# Patient Record
Sex: FEMALE | Race: WHITE | URBAN - METROPOLITAN AREA
[De-identification: names, ages, dates, MRNs, and addresses within clinical notes are randomized per-mention and may not be internally consistent; named-entity substitution may affect disease eponyms.]

---

## 2017-01-31 ENCOUNTER — OFFICE VISIT (OUTPATIENT)
Dept: FAMILY MEDICINE | Facility: CLINIC | Age: 32
End: 2017-01-31
Payer: COMMERCIAL

## 2017-01-31 VITALS
SYSTOLIC BLOOD PRESSURE: 120 MMHG | RESPIRATION RATE: 16 BRPM | WEIGHT: 138 LBS | HEART RATE: 68 BPM | HEIGHT: 66 IN | TEMPERATURE: 98.1 F | BODY MASS INDEX: 22.18 KG/M2 | DIASTOLIC BLOOD PRESSURE: 78 MMHG

## 2017-01-31 DIAGNOSIS — R87.612 PAPANICOLAOU SMEAR OF CERVIX WITH LOW GRADE SQUAMOUS INTRAEPITHELIAL LESION (LGSIL): ICD-10-CM

## 2017-01-31 LAB — BETA HCG QUAL IFA URINE: NEGATIVE

## 2017-01-31 PROCEDURE — 88305 TISSUE EXAM BY PATHOLOGIST: CPT | Mod: 59 | Performed by: FAMILY MEDICINE

## 2017-01-31 PROCEDURE — 57421 EXAM/BIOPSY OF VAG W/SCOPE: CPT | Performed by: FAMILY MEDICINE

## 2017-01-31 PROCEDURE — 99213 OFFICE O/P EST LOW 20 MIN: CPT | Mod: 25 | Performed by: FAMILY MEDICINE

## 2017-01-31 PROCEDURE — 84703 CHORIONIC GONADOTROPIN ASSAY: CPT | Performed by: FAMILY MEDICINE

## 2017-01-31 NOTE — PROGRESS NOTES
Lauren Fallon is a .31 year old female who presents for initial colposcopy, referred by Dr Cuadra. Pap smear 2 months ago showed: LSIL with positive high risk HPV . The prior pap showed LSIL.     Past Medical History   Diagnosis Date     Papanicolaou smear of cervix with low grade squamous intraepithelial lesion (LGSIL) (aka LSIL) 10/2015, 11/09/16     colp - WNL     Hx of colposcopy with cervical biopsy      Cervical high risk HPV (human papillomavirus) test positive 11/09/16     Type 16 and other     Family History   Problem Relation Age of Onset     Unknown/Adopted Mother      Unknown/Adopted Father        Previous history of abnormal paps?: Yes  In 2015 was LSIL   History of cryotherapy (freezing)?: : No  History of veneral diseases: : No  Do you desire testing for any of these diseases? : No  History of genital warts:  No  Visible warts now?:  No  Previously treated? If so, how?:  No     No LMP recorded.    Type of contraception: oral contraceptive  Age at first sexual intercourse: 20  Number of sexual partners (lifetime): 5   History   Smoking status     Never Smoker    Smokeless tobacco     Never Used       History of sexual abuse:  No    No Known Allergies    PROCEDURE:  Before the procedure, it was ensured that the patient was educated regarding the nature of her findings to date, the implications of them, and what was to be done. She has been made aware of the role of HPV, the natural history of infection, ways to minimize her future risk, the effect of HPV on the cervix, and treatment options available should they be indicated. The   pathophysiology of the cervix, including a discussion of squamous vs. endometrial cells, and squamous metaplasia have all been reviewed, using illustrations and sketches. The details of the colposcopic procedure were reviewed, as well as the risks of missed diagnoses, pain, infection and bleeding. All questions were answered before proceeding, and informed consent was  therefore obtained.    Bimanual examination: was performed and was unremarkable.    The following examination was done with the colposcope:    Unenhanced examination of the cervix was normal without lesions.  Pap smear and endocervical sampling not obtained due to:    not due  Please refer to images section for details!  Pap repeated?:  No  SCJ seen?:  no  Endocervical speculum needed?:  Yes   ECC done?:  Yes   Lugol's solution used?:  Yes   Satisfactory examination?:  no    Vaginal vault: normal to cursory inspection   Urethra normal?:  yes  Labia normal?:  yes  Perineum normal?:  yes  Rectum normal?:  yes    FINDINGS:  Please see image   Cervix: acetowhite area(s) at 6:00 and 9 o'clock   Procedure: biopsies taken (not including ECC): 2.    Procedure summary: Patient tolerated procedure well     Assessment: HPV related changes      Plan: Specimens labelled and sent to pathology., Will base further treatment on pathology findings. and post biopsy instructions given to patient    15 min spent in counseling on the abnormal PAP, colposcopy and the HPV .

## 2017-01-31 NOTE — NURSING NOTE
"Chief Complaint   Patient presents with     Colposcopy     /78 mmHg  Pulse 68  Temp(Src) 98.1  F (36.7  C) (Oral)  Resp 16  Ht 5' 5.5\" (1.664 m)  Wt 138 lb (62.596 kg)  BMI 22.61 kg/m2 Estimated body mass index is 22.61 kg/(m^2) as calculated from the following:    Height as of this encounter: 5' 5.5\" (1.664 m).    Weight as of this encounter: 138 lb (62.596 kg).  bp completed using cuff size: regular       Health Maintenance addressed:  NONE    n/a    Shantell Cerrato MA       "

## 2017-01-31 NOTE — PROGRESS NOTES
SUBJECTIVE:                                                    Lauren Fallon is a 31 year old female who presents to clinic today for the following health issues:        HPI      ROS      Physical Exam

## 2017-02-03 LAB — COPATH REPORT: NORMAL

## 2017-02-15 ENCOUNTER — OFFICE VISIT (OUTPATIENT)
Dept: FAMILY MEDICINE | Facility: CLINIC | Age: 32
End: 2017-02-15
Payer: COMMERCIAL

## 2017-02-15 VITALS
OXYGEN SATURATION: 100 % | BODY MASS INDEX: 23.6 KG/M2 | SYSTOLIC BLOOD PRESSURE: 110 MMHG | RESPIRATION RATE: 14 BRPM | HEART RATE: 64 BPM | TEMPERATURE: 98.1 F | DIASTOLIC BLOOD PRESSURE: 60 MMHG | WEIGHT: 144 LBS

## 2017-02-15 DIAGNOSIS — N87.0 CIN I (CERVICAL INTRAEPITHELIAL NEOPLASIA I): Primary | ICD-10-CM

## 2017-02-15 PROCEDURE — 99213 OFFICE O/P EST LOW 20 MIN: CPT | Performed by: FAMILY MEDICINE

## 2017-02-15 NOTE — PROGRESS NOTES
SUBJECTIVE:                                                    Lauren Fallon is a 31 year old female who presents to clinic today for the following health issues:      Chief Complaint   Patient presents with     Results     colp results-done 1-31-17             Problem list and histories reviewed & adjusted, as indicated.  Additional history: as documented    Patient Active Problem List   Diagnosis     CARDIOVASCULAR SCREENING; LDL GOAL LESS THAN 160     Cystic acne     Cold sore     Family history of breast cancer in mother     Family history of melanoma     Papanicolaou smear of cervix with low grade squamous intraepithelial lesion (LGSIL) (aka LSIL)     Encounter for surveillance of contraceptive pills     Contraception     History reviewed. No pertinent past surgical history.    Social History   Substance Use Topics     Smoking status: Never Smoker     Smokeless tobacco: Never Used     Alcohol use 0.0 oz/week     0 Standard drinks or equivalent per week      Comment: socially     Family History   Problem Relation Age of Onset     Unknown/Adopted Mother      Unknown/Adopted Father          Current Outpatient Prescriptions   Medication Sig Dispense Refill     levonorgestrel-ethinyl estradiol (AVIANE,ALESSE,LESSINA) 0.1-20 MG-MCG per tablet Take 1 tablet by mouth daily 84 tablet 3     spironolactone (ALDACTONE) 50 MG tablet Take 1 tablet (50 mg) by mouth daily 90 tablet 3     valACYclovir (VALTREX) 1000 mg tablet Take 2 tablets (2,000 mg) by mouth 2 times daily At first onset of cold sore for 1 day. 12 tablet 1     fluocinonide (LIDEX) 0.05 % cream Apply sparingly to affected area twice daily for 14 days.  Do not apply to face. 30 g 0     [DISCONTINUED] levonorgestrel-ethinyl estradiol (AVIANE,ALESSE,LESSINA) 0.1-20 MG-MCG per tablet Take 1 tablet by mouth daily 90 tablet 3     No Known Allergies  Recent Labs   Lab Test  11/09/16   1658  10/08/15   0940  09/15/14   0811   09/13/12   1837   LDL   --    --    85   --    --    HDL   --    --   105   --    --    TRIG   --    --   159*   --    --    ALT   --    --    --    --   12   CR  0.71  0.83  0.86   < >  0.88   GFRESTIMATED  >90  Non  GFR Calc    82  78   < >  78   GFRESTBLACK  >90   GFR Calc    >90   GFR Calc    >90   GFR Calc     < >  >90   POTASSIUM  3.6  4.0  3.4   < >  3.8   TSH  1.29   --    --    --   0.90    < > = values in this interval not displayed.      BP Readings from Last 3 Encounters:   02/15/17 110/60   01/31/17 120/78   11/09/16 128/82    Wt Readings from Last 3 Encounters:   02/15/17 144 lb (65.3 kg)   01/31/17 138 lb (62.6 kg)   11/09/16 138 lb (62.6 kg)                  Labs reviewed in EPIC  Problem list, Medication list, Allergies, and Medical/Social/Surgical histories reviewed in [facility] and updated as appropriate.    ROS:  Constitutional, HEENT, cardiovascular, pulmonary, GI, , musculoskeletal, neuro, skin, endocrine and psych systems are negative, except as otherwise noted.    OBJECTIVE:                                                    /60 (BP Location: Right arm, Patient Position: Chair, Cuff Size: Adult Regular)  Pulse 64  Temp 98.1  F (36.7  C) (Oral)  Resp 14  Wt 144 lb (65.3 kg)  SpO2 100%  BMI 23.6 kg/m2  Body mass index is 23.6 kg/(m^2).  GENERAL: healthy, alert and no distress  MS: no gross musculoskeletal defects noted, no edema    Diagnostic Test Results:  none      ASSESSMENT/PLAN:                                                        1. AVIVA I (cervical intraepithelial neoplasia I)  We discussed the colposcopy/biopsy results with the pt and she has AVIVA 1 or mild dysplasia on a couple of the samples , so recommended that she does her PAP in one year,       RTC if no improving or worsening.  Pt is aware  and comfortable with the current plan.      Tiffanie Huang MD  [facility]

## 2017-02-15 NOTE — MR AVS SNAPSHOT
After Visit Summary   2/15/2017    Lauren Fallon    MRN: [id number]           Patient Information     Date Of Birth          1985        Visit Information        Provider Department      2/15/2017 4:00 PM Tiffanie Huang MD [facility]        Today's Diagnoses     AVIVA I (cervical intraepithelial neoplasia I)    -  1       Follow-ups after your visit        Who to contact     If you have questions or need follow up information about today's clinic visit or your schedule please contact [facility] directly at [phone].  Normal or non-critical lab and imaging results will be communicated to you by Mobile Completehart, letter or phone within 4 business days after the clinic has received the results. If you do not hear from us within 7 days, please contact the clinic through [employer] or phone. If you have a critical or abnormal lab result, we will notify you by phone as soon as possible.  Submit refill requests through [email or web address] or call your pharmacy and they will forward the refill request to us. Please allow 3 business days for your refill to be completed.          Additional Information About Your Visit        MyChart Information     [email or web address] gives you secure access to your electronic health record. If you see a primary care provider, you can also send messages to your care team and make appointments. If you have questions, please call your primary care clinic.  If you do not have a primary care provider, please call [phone] and they will assist you.        Care EveryWhere ID     This is your Care EveryWhere ID. This could be used by other organizations to access your [place] medical records  [id number]        Your Vitals Were     Pulse Temperature Respirations Pulse Oximetry BMI (Body Mass Index)       64 98.1  F (36.7  C) (Oral) 14 100% 23.6 kg/m2        Blood Pressure from Last 3 Encounters:   02/15/17 110/60   01/31/17 120/78   11/09/16 128/82    Weight from Last 3  Encounters:   02/15/17 144 lb (65.3 kg)   01/31/17 138 lb (62.6 kg)   11/09/16 138 lb (62.6 kg)              Today, you had the following     No orders found for display       Primary Care Provider Office Phone # Fax #    Shantell Fall [phone]       [address] [address]        Thank you!     Thank you for choosing [facility]  for your care. Our goal is always to provide you with excellent care. Hearing back from our patients is one way we can continue to improve our services. Please take a few minutes to complete the written survey that you may receive in the mail after your visit with us. Thank you!             Your Updated Medication List - Protect others around you: Learn how to safely use, store and throw away your medicines at www.disposemymeds.org.          This list is accurate as of: 2/15/17  9:11 PM.  Always use your most recent med list.                   Brand Name Dispense Instructions for use    fluocinonide 0.05 % cream    LIDEX    30 g    Apply sparingly to affected area twice daily for 14 days.  Do not apply to face.       levonorgestrel-ethinyl estradiol 0.1-20 MG-MCG per tablet    AVIANE,ALESSE,LESSINA    84 tablet    Take 1 tablet by mouth daily       spironolactone 50 MG tablet    ALDACTONE    90 tablet    Take 1 tablet (50 mg) by mouth daily       valACYclovir 1000 mg tablet    VALTREX    12 tablet    Take 2 tablets (2,000 mg) by mouth 2 times daily At first onset of cold sore for 1 day.

## 2017-02-15 NOTE — NURSING NOTE
"Chief Complaint   Patient presents with     Results     colp results-done 1-31-17       Initial /60 (BP Location: Right arm, Patient Position: Chair, Cuff Size: Adult Regular)  Pulse 64  Temp 98.1  F (36.7  C) (Oral)  Resp 14  Wt 144 lb (65.3 kg)  SpO2 100%  BMI 23.6 kg/m2 Estimated body mass index is 23.6 kg/(m^2) as calculated from the following:    Height as of 1/31/17: 5' 5.5\" (1.664 m).    Weight as of this encounter: 144 lb (65.3 kg).  BP completed using cuff size: regular    Health Maintenance that is potentially due pending provider review:  There are no preventive care reminders to display for this patient.      n/a  "

## 2017-04-11 ENCOUNTER — TELEPHONE (OUTPATIENT)
Dept: FAMILY MEDICINE | Facility: CLINIC | Age: 32
End: 2017-04-11

## 2017-04-11 ENCOUNTER — MYC REFILL (OUTPATIENT)
Dept: FAMILY MEDICINE | Facility: CLINIC | Age: 32
End: 2017-04-11

## 2017-04-11 DIAGNOSIS — B00.1 COLD SORE: ICD-10-CM

## 2017-04-11 NOTE — TELEPHONE ENCOUNTER
Message from MyChart:  Original authorizing provider: MD Lauren Abbasi would like a refill of the following medications:  valACYclovir (VALTREX) 1000 mg tablet [Nona Cuadra MD]    Preferred pharmacy: [address] [address]    Comment:

## 2017-04-11 NOTE — TELEPHONE ENCOUNTER
Reason for Call:  Medication or medication refill:    Do you use a [facility]?  Name of the pharmacy and phone number for the current request:            [facility] [id number] [place], [place] - [address] [place] [address] [address]        Name of the medication requested: valACYclovir (VALTREX) 1000 mg tablet  Other request: one month supply    Can we leave a detailed message on this number? YES    Phone number patient can be reached at: Home number on file [phone] (home)    Best Time: anytime    Call taken on 4/11/2017 at 12:15 PM by Karen Jaems

## 2017-04-12 RX ORDER — VALACYCLOVIR HYDROCHLORIDE 1 G/1
2000 TABLET, FILM COATED ORAL 2 TIMES DAILY
Qty: 12 TABLET | Refills: 3 | Status: SHIPPED | OUTPATIENT
Start: 2017-04-12 | End: 2017-10-13

## 2017-04-12 NOTE — TELEPHONE ENCOUNTER
Valtrex         Last Written Prescription Date: 11/9/16  Last Fill Quantity: 12, # refills: 1    [facility] with [facility] or [facility] prescribing provider:  2/15/17   Future Office Visit:       Lab Results   Component Value Date    WBC 7.7 11/09/2016     Lab Results   Component Value Date    RBC 3.58 11/09/2016     Lab Results   Component Value Date    HGB 11.5 11/09/2016     Lab Results   Component Value Date    HCT 34.9 11/09/2016     No components found for: MCT  Lab Results   Component Value Date    MCV 98 11/09/2016     Lab Results   Component Value Date    MCH 32.1 11/09/2016     Lab Results   Component Value Date    MCHC 33.0 11/09/2016     Lab Results   Component Value Date    RDW 11.7 11/09/2016     Lab Results   Component Value Date     11/09/2016     Lab Results   Component Value Date    AST 29 09/13/2012     Lab Results   Component Value Date    ALT 12 09/13/2012     Creatinine   Date Value Ref Range Status   11/09/2016 0.71 0.52 - 1.04 mg/dL Final   ]  Prescription approved per [facility] Refill Protocol.

## 2017-06-14 ENCOUNTER — OFFICE VISIT (OUTPATIENT)
Dept: FAMILY MEDICINE | Facility: CLINIC | Age: 32
End: 2017-06-14
Payer: COMMERCIAL

## 2017-06-14 VITALS
SYSTOLIC BLOOD PRESSURE: 115 MMHG | WEIGHT: 138.6 LBS | DIASTOLIC BLOOD PRESSURE: 78 MMHG | OXYGEN SATURATION: 99 % | HEART RATE: 88 BPM | HEIGHT: 66 IN | BODY MASS INDEX: 22.28 KG/M2 | TEMPERATURE: 97.4 F

## 2017-06-14 DIAGNOSIS — Z11.3 SCREEN FOR STD (SEXUALLY TRANSMITTED DISEASE): ICD-10-CM

## 2017-06-14 DIAGNOSIS — R10.84 ABDOMINAL PAIN, GENERALIZED: Primary | ICD-10-CM

## 2017-06-14 DIAGNOSIS — D64.9 MILD ANEMIA: ICD-10-CM

## 2017-06-14 LAB
BASOPHILS # BLD AUTO: 0 10E9/L (ref 0–0.2)
BASOPHILS NFR BLD AUTO: 0.4 %
DIFFERENTIAL METHOD BLD: ABNORMAL
EOSINOPHIL # BLD AUTO: 0.1 10E9/L (ref 0–0.7)
EOSINOPHIL NFR BLD AUTO: 1.6 %
ERYTHROCYTE [DISTWIDTH] IN BLOOD BY AUTOMATED COUNT: 12.8 % (ref 10–15)
HCT VFR BLD AUTO: 35.4 % (ref 35–47)
HGB BLD-MCNC: 12 G/DL (ref 11.7–15.7)
LYMPHOCYTES # BLD AUTO: 1.7 10E9/L (ref 0.8–5.3)
LYMPHOCYTES NFR BLD AUTO: 20.6 %
MCH RBC QN AUTO: 34.3 PG (ref 26.5–33)
MCHC RBC AUTO-ENTMCNC: 33.9 G/DL (ref 31.5–36.5)
MCV RBC AUTO: 101 FL (ref 78–100)
MONOCYTES # BLD AUTO: 0.7 10E9/L (ref 0–1.3)
MONOCYTES NFR BLD AUTO: 8.9 %
NEUTROPHILS # BLD AUTO: 5.5 10E9/L (ref 1.6–8.3)
NEUTROPHILS NFR BLD AUTO: 68.5 %
PLATELET # BLD AUTO: 306 10E9/L (ref 150–450)
RBC # BLD AUTO: 3.5 10E12/L (ref 3.8–5.2)
WBC # BLD AUTO: 8.1 10E9/L (ref 4–11)

## 2017-06-14 PROCEDURE — 87491 CHLMYD TRACH DNA AMP PROBE: CPT | Performed by: FAMILY MEDICINE

## 2017-06-14 PROCEDURE — 85025 COMPLETE CBC W/AUTO DIFF WBC: CPT | Performed by: FAMILY MEDICINE

## 2017-06-14 PROCEDURE — 36415 COLL VENOUS BLD VENIPUNCTURE: CPT | Performed by: FAMILY MEDICINE

## 2017-06-14 PROCEDURE — 82607 VITAMIN B-12: CPT | Performed by: FAMILY MEDICINE

## 2017-06-14 PROCEDURE — 99214 OFFICE O/P EST MOD 30 MIN: CPT | Performed by: FAMILY MEDICINE

## 2017-06-14 PROCEDURE — 87591 N.GONORRHOEAE DNA AMP PROB: CPT | Performed by: FAMILY MEDICINE

## 2017-06-14 NOTE — MR AVS SNAPSHOT
"              After Visit Summary   6/14/2017    Lauren Fallon    MRN: [id number]           Patient Information     Date Of Birth          1985        Visit Information        Provider Department      6/14/2017 8:15 AM Lori Meadows MD [facility]        Today's Diagnoses     Abdominal pain, generalized    -  1    Mild anemia - high MCV        Screen for STD (sexually transmitted disease)           Follow-ups after your visit        Who to contact     If you have questions or need follow up information about today's clinic visit or your schedule please contact [facility] directly at [phone].  Normal or non-critical lab and imaging results will be communicated to you by MyChart, letter or phone within 4 business days after the clinic has received the results. If you do not hear from us within 7 days, please contact the clinic through [employer]hart or phone. If you have a critical or abnormal lab result, we will notify you by phone as soon as possible.  Submit refill requests through [employer] or call your pharmacy and they will forward the refill request to us. Please allow 3 business days for your refill to be completed.          Additional Information About Your Visit        MyChart Information     [employer] gives you secure access to your electronic health record. If you see a primary care provider, you can also send messages to your care team and make appointments. If you have questions, please call your primary care clinic.  If you do not have a primary care provider, please call [phone] and they will assist you.        Care EveryWhere ID     This is your Care EveryWhere ID. This could be used by other organizations to access your [place] medical records  [id number]        Your Vitals Were     Pulse Temperature Height Last Period Pulse Oximetry Breastfeeding?    88 97.4  F (36.3  C) (Oral) 5' 5.5\" (1.664 m) 06/12/2017 (Exact Date) 99% No    BMI (Body Mass Index) "                   22.71 kg/m2            Blood Pressure from Last 3 Encounters:   06/14/17 115/78   02/15/17 110/60   01/31/17 120/78    Weight from Last 3 Encounters:   06/14/17 138 lb 9.6 oz (62.9 kg)   02/15/17 144 lb (65.3 kg)   01/31/17 138 lb (62.6 kg)              We Performed the Following     CBC with platelets and differential     CHLAMYDIA TRACHOMATIS PCR     NEISSERIA GONORRHOEA PCR     Vitamin B12        Primary Care Provider Office Phone # Fax #    Nona Eduardo Cuadra [phone]       [facility] [address] [facility] [address]        Equal Access to Services     HANNA AYALA : Adal Mijares, wagerman bender, qaybta kaalmada halina, jesus noriega . So [facility] [phone].    ATENCIÓN: Si habla español, tiene a baltazar disposición servicios gratuitos de asistencia lingüística. Llame al [phone].    We comply with applicable federal civil rights laws and [place] laws. We do not discriminate on the basis of race, color, national origin, age, disability sex, sexual orientation or gender identity.            Thank you!     Thank you for choosing [facility]  for your care. Our goal is always to provide you with excellent care. Hearing back from our patients is one way we can continue to improve our services. Please take a few minutes to complete the written survey that you may receive in the mail after your visit with us. Thank you!             Your Updated Medication List - Protect others around you: Learn how to safely use, store and throw away your medicines at www.disposemymeds.org.          This list is accurate as of: 6/14/17 11:59 PM.  Always use your most recent med list.                   Brand Name Dispense Instructions for use Diagnosis    fluocinonide 0.05 % cream    LIDEX    30 g    Apply sparingly to affected area twice daily for 14 days.  Do not apply to face.    Phytophotodermatitis        levonorgestrel-ethinyl estradiol 0.1-20 MG-MCG per tablet    AVIANE,ALESSE,LESSINA    84 tablet    Take 1 tablet by mouth daily    Family planning       spironolactone 50 MG tablet    ALDACTONE    90 tablet    Take 1 tablet (50 mg) by mouth daily    Cystic acne       valACYclovir 1000 mg tablet    VALTREX    12 tablet    Take 2 tablets (2,000 mg) by mouth 2 times daily At first onset of cold sore for 1 day.    Cold sore

## 2017-06-14 NOTE — PROGRESS NOTES
SUBJECTIVE:                                                    Lauren Fallon is a 31 year old female who presents to clinic today for the following health issues:    Abdominal Pain      Duration: yesterday     Description (location/character/radiation): near belly button       Associated flank pain: None    Intensity:  moderate    Accompanying signs and symptoms:        Fever/Chills: no        Gas/Bloating: YES- bloated        Nausea/vomitting: no        Diarrhea: no        Dysuria or Hematuria: no     History (previous similar pain/trauma/previous testing): none    Precipitating or alleviating factors:       Pain worse with eating/BM/urination: no       Pain relieved by BM: YES- somewhat     Therapies tried and outcome: None    LMP:  6/12/2017     Stomach pain-   Yesterday morning, felt crampy, near umbilicus.  Got period Mon night, so didn't think much.  Stomach tenderness to touch by noon yesteday.  Last night, could sleep, but hurts when walking, touching.  Feels really bloated in lower abd and around belly button.  Pain rated 5/10.  Just slightly worse than yesterday morning.    Loss of appetite, feels full.  Still eating, but not as much, drinking.  Has had BMs, normal.  No change.  No fevers.  No urinary sx's.  New partner since gc/chlam ni 11/16, no condom use.    Period now- usually has back cramps and soreness.  This is painful, gassy (but no gas), bloated.      Problem list and histories reviewed & adjusted, as indicated.  Additional history: as documented    Patient Active Problem List   Diagnosis     CARDIOVASCULAR SCREENING; LDL GOAL LESS THAN 160     Cystic acne     Cold sore     Family history of breast cancer in mother     Family history of melanoma     Papanicolaou smear of cervix with low grade squamous intraepithelial lesion (LGSIL) (aka LSIL)     Encounter for surveillance of contraceptive pills     Contraception      Current Outpatient Prescriptions   Medication Sig Dispense Refill  "    levonorgestrel-ethinyl estradiol (AVIANE,ALESSE,LESSINA) 0.1-20 MG-MCG per tablet Take 1 tablet by mouth daily 84 tablet 3     spironolactone (ALDACTONE) 50 MG tablet Take 1 tablet (50 mg) by mouth daily 90 tablet 3     valACYclovir (VALTREX) 1000 mg tablet Take 2 tablets (2,000 mg) by mouth 2 times daily At first onset of cold sore for 1 day. 12 tablet 3     fluocinonide (LIDEX) 0.05 % cream Apply sparingly to affected area twice daily for 14 days.  Do not apply to face. 30 g 0     No Known Allergies  BP Readings from Last 3 Encounters:   06/14/17 115/78   02/15/17 110/60   01/31/17 120/78    Wt Readings from Last 3 Encounters:   06/14/17 138 lb 9.6 oz (62.9 kg)   02/15/17 144 lb (65.3 kg)   01/31/17 138 lb (62.6 kg)           Labs reviewed in EPIC    Reviewed and updated as needed this visit by clinical staff  Tobacco  Allergies  Meds  Problems       Reviewed and updated as needed this visit by Provider  Allergies  Meds  Problems         ROS:  Constitutional, HEENT, cardiovascular, pulmonary, gi and gu systems are negative, except as otherwise noted.    OBJECTIVE:     /78  Pulse 88  Temp 97.4  F (36.3  C) (Oral)  Ht 5' 5.5\" (1.664 m)  Wt 138 lb 9.6 oz (62.9 kg)  LMP 06/12/2017 (Exact Date)  SpO2 99%  Breastfeeding? No  BMI 22.71 kg/m2  Body mass index is 22.71 kg/(m^2).  GENERAL APPEARANCE: healthy, alert and no distress     EYES: PERRL, sclera clear     HENT: nose and mouth without ulcers or lesions     NECK: no adenopathy, no asymmetry, masses, or scars and thyroid normal to palpation     RESP: lungs clear to auscultation - no rales, rhonchi or wheezes     CV: regular rates and rhythm, normal S1 S2, no S3 or S4 and no murmur, click or rub      Abdomen: soft, nontender, no HSM or masses and bowel sounds normal     Ext: warm, dry, no edema     Diagnostic Test Results:  Results for orders placed or performed in visit on 06/14/17   CBC with platelets and differential   Result Value Ref Range "    WBC 8.1 4.0 - 11.0 10e9/L    RBC Count 3.50 (L) 3.8 - 5.2 10e12/L    Hemoglobin 12.0 11.7 - 15.7 g/dL    Hematocrit 35.4 35.0 - 47.0 %     (H) 78 - 100 fl    MCH 34.3 (H) 26.5 - 33.0 pg    MCHC 33.9 31.5 - 36.5 g/dL    RDW 12.8 10.0 - 15.0 %    Platelet Count 306 150 - 450 10e9/L    Diff Method Automated Method     % Neutrophils 68.5 %    % Lymphocytes 20.6 %    % Monocytes 8.9 %    % Eosinophils 1.6 %    % Basophils 0.4 %    Absolute Neutrophil 5.5 1.6 - 8.3 10e9/L    Absolute Lymphocytes 1.7 0.8 - 5.3 10e9/L    Absolute Monocytes 0.7 0.0 - 1.3 10e9/L    Absolute Eosinophils 0.1 0.0 - 0.7 10e9/L    Absolute Basophils 0.0 0.0 - 0.2 10e9/L   Vitamin B12   Result Value Ref Range    Vitamin B12 398 193 - 986 pg/mL   NEISSERIA GONORRHOEA PCR   Result Value Ref Range    Specimen Descrip Cervix     N Gonorrhea PCR  NEG     Negative   Negative for N. gonorrhoeae rRNA by transcription mediated amplification.   A negative result by transcription mediated amplification does not preclude the   presence of N. gonorrhoeae infection because results are dependent on proper   and adequate collection, absence of inhibitors, and sufficient rRNA to be   detected.     CHLAMYDIA TRACHOMATIS PCR   Result Value Ref Range    Specimen Description Cervix     Chlamydia Trachomatis PCR  NEG     Negative   Negative for C. trachomatis rRNA by transcription mediated amplification.   A negative result by transcription mediated amplification does not preclude the   presence of C. trachomatis infection because results are dependent on proper   and adequate collection, absence of inhibitors, and sufficient rRNA to be   detected.         ASSESSMENT/PLAN:       ICD-10-CM    1. Abdominal pain, generalized R10.84 CBC with platelets and differential     NEISSERIA GONORRHOEA PCR     CHLAMYDIA TRACHOMATIS PCR   2. Mild anemia - high MCV D64.9 Vitamin B12     CANCELED: Folate   3. Screen for STD (sexually transmitted disease) Z11.3 NEISSERIA  GONORRHOEA PCR     CHLAMYDIA TRACHOMATIS PCR     Generalized abd pain since yesterday, 5/10.  First exam, pain most around umbilicus, but painful to mild/mod palpation all over.  WBC normal.  Re-examined with pelvic- no pain to CMT, no pain to palpation of uterus or ovaries.  Pain also more localized to LLQ, minimal pain on right side or umbilicus, no left pelvic area pain.    Discussed reassurance with nl WBC, and no pain to deep palpation in RLQ at end of exam.   Pt states no chance she could be pregnant with completely normal period and on consistent OCP.  Will try miralax to see if sx's improve/resolve.  Will check gc/chlam with new partner, but pelvic area pain or CMT.     Stressed importance of returning to clinic if persisting pain, to ER if worsening pain.      Hgb low end of normal, with MCV of 102.  Will see if able to add B12 and folate to labs (looks like unable to add folate).      Lori Meadows MD  [facility]

## 2017-06-14 NOTE — NURSING NOTE
"Chief Complaint   Patient presents with     Abdominal Pain     /78  Pulse 88  Temp 97.4  F (36.3  C) (Oral)  Ht 5' 5.5\" (1.664 m)  Wt 138 lb 9.6 oz (62.9 kg)  LMP 06/12/2017 (Exact Date)  SpO2 99%  Breastfeeding? No  BMI 22.71 kg/m2 Estimated body mass index is 22.71 kg/(m^2) as calculated from the following:    Height as of this encounter: 5' 5.5\" (1.664 m).    Weight as of this encounter: 138 lb 9.6 oz (62.9 kg).  BP completed using cuff size: regular       Health Maintenance due pending provider review:  NONE    n/a    Sarah Luna MA  "

## 2017-06-15 LAB
C TRACH DNA SPEC QL NAA+PROBE: NORMAL
N GONORRHOEA DNA SPEC QL NAA+PROBE: NORMAL
SPECIMEN SOURCE: NORMAL
SPECIMEN SOURCE: NORMAL

## 2017-06-16 LAB — VIT B12 SERPL-MCNC: 398 PG/ML (ref 193–986)

## 2017-06-29 PROBLEM — R71.8 ELEVATED MCV: Status: ACTIVE | Noted: 2017-06-29

## 2017-06-29 NOTE — PROGRESS NOTES
Here are your lab results from your recent visit...  -Your STD labs (gonorrhea, chlamydia) were both negative.  -Your CBC labs (which includes blood labs looking for signs of infection or anemia) showed a slightly improved hemoglobin which is good, though the size of your red blood cells are still high (MCV now at 101).  I was able to add on a Vitamin B12 lab to your blood draw, and this was normal.  I wasn't able to add on a folate level.  Deficiencies in either B12 or folate can cause an elevation in the MCV and anemia.  I don't think you need to come back in to have this checked, but I will put a note in your chart to continue to monitor this for you to make sure it's not getting worse, and to check a folate level the next time you get labs for this.    Please let me know if you have any questions, or if your stomach pain is not resolving.  Best,   Freddy Meadows MD

## 2017-09-30 DIAGNOSIS — L70.0 CYSTIC ACNE: ICD-10-CM

## 2017-10-01 NOTE — TELEPHONE ENCOUNTER
Vienva       Last Written Prescription Date: 11/15/2016  Last Fill Quantity: 84,  # refills: 3   Last Office Visit with G, [facility] or [facility] prescribing provider: 06/14/2017

## 2017-10-02 RX ORDER — TIMOLOL MALEATE 5 MG/ML
SOLUTION/ DROPS OPHTHALMIC
Qty: 28 TABLET | Refills: 0 | Status: SHIPPED | OUTPATIENT
Start: 2017-10-02 | End: 2017-10-13

## 2017-10-02 NOTE — TELEPHONE ENCOUNTER
Medication is being filled for 1 time refill only due to:  due for physical November 2017   Ni OCAMPO RN

## 2017-10-09 RX ORDER — TIMOLOL MALEATE 5 MG/ML
SOLUTION/ DROPS OPHTHALMIC
Start: 2017-10-09

## 2017-10-09 NOTE — TELEPHONE ENCOUNTER
Denied  Refill request too early; Rx sent 10/2/2017 for 1 month, pt needs appt  Ni OCAMPO, RN    Pending Prescriptions:                       Disp   Refills    VIENVA 0.1-20 MG-MCG per tablet [Pharmacy *28 tab*         Sig: TAKE 1 TABLET BY MOUTH  DAILY

## 2017-10-13 ENCOUNTER — OFFICE VISIT (OUTPATIENT)
Dept: FAMILY MEDICINE | Facility: CLINIC | Age: 32
End: 2017-10-13
Payer: COMMERCIAL

## 2017-10-13 VITALS
BODY MASS INDEX: 24.12 KG/M2 | OXYGEN SATURATION: 99 % | WEIGHT: 150.1 LBS | HEIGHT: 66 IN | SYSTOLIC BLOOD PRESSURE: 120 MMHG | DIASTOLIC BLOOD PRESSURE: 78 MMHG | TEMPERATURE: 97.9 F | HEART RATE: 71 BPM

## 2017-10-13 DIAGNOSIS — Z11.3 SCREEN FOR STD (SEXUALLY TRANSMITTED DISEASE): ICD-10-CM

## 2017-10-13 DIAGNOSIS — R87.612 PAPANICOLAOU SMEAR OF CERVIX WITH LOW GRADE SQUAMOUS INTRAEPITHELIAL LESION (LGSIL): ICD-10-CM

## 2017-10-13 DIAGNOSIS — B00.1 COLD SORE: ICD-10-CM

## 2017-10-13 DIAGNOSIS — Z00.00 ROUTINE GENERAL MEDICAL EXAMINATION AT A HEALTH CARE FACILITY: Primary | ICD-10-CM

## 2017-10-13 DIAGNOSIS — L70.0 CYSTIC ACNE: ICD-10-CM

## 2017-10-13 PROCEDURE — 87491 CHLMYD TRACH DNA AMP PROBE: CPT | Performed by: FAMILY MEDICINE

## 2017-10-13 PROCEDURE — 88141 CYTOPATH C/V INTERPRET: CPT | Performed by: INTERNAL MEDICINE

## 2017-10-13 PROCEDURE — 80048 BASIC METABOLIC PNL TOTAL CA: CPT | Performed by: FAMILY MEDICINE

## 2017-10-13 PROCEDURE — 87591 N.GONORRHOEAE DNA AMP PROB: CPT | Performed by: FAMILY MEDICINE

## 2017-10-13 PROCEDURE — 99395 PREV VISIT EST AGE 18-39: CPT | Performed by: FAMILY MEDICINE

## 2017-10-13 PROCEDURE — 87389 HIV-1 AG W/HIV-1&-2 AB AG IA: CPT | Performed by: FAMILY MEDICINE

## 2017-10-13 PROCEDURE — 36415 COLL VENOUS BLD VENIPUNCTURE: CPT | Performed by: FAMILY MEDICINE

## 2017-10-13 PROCEDURE — 88175 CYTOPATH C/V AUTO FLUID REDO: CPT | Performed by: FAMILY MEDICINE

## 2017-10-13 PROCEDURE — 87624 HPV HI-RISK TYP POOLED RSLT: CPT | Performed by: FAMILY MEDICINE

## 2017-10-13 PROCEDURE — 86780 TREPONEMA PALLIDUM: CPT | Performed by: FAMILY MEDICINE

## 2017-10-13 RX ORDER — SPIRONOLACTONE 50 MG/1
50 TABLET, FILM COATED ORAL DAILY
Qty: 90 TABLET | Refills: 3 | Status: SHIPPED | OUTPATIENT
Start: 2017-10-13 | End: 2018-10-19

## 2017-10-13 RX ORDER — VALACYCLOVIR HYDROCHLORIDE 1 G/1
2000 TABLET, FILM COATED ORAL 2 TIMES DAILY
Qty: 12 TABLET | Refills: 3 | Status: SHIPPED | OUTPATIENT
Start: 2017-10-13 | End: 2018-09-05

## 2017-10-13 RX ORDER — LEVONORGESTREL/ETHIN.ESTRADIOL 0.1-0.02MG
1 TABLET ORAL DAILY
Qty: 90 TABLET | Refills: 3 | Status: SHIPPED | OUTPATIENT
Start: 2017-10-13 | End: 2018-10-19

## 2017-10-13 NOTE — PROGRESS NOTES
SUBJECTIVE:   CC: Lauren Fallon is an 31 year old woman who presents for preventive health visit.   Patient reports she Wants refill on oral contraceptive pills 7 sprinolactone- it helps with acne,Also refill on valtrex for cold sore- uses as needed - also wants sexually transmitted infection screening      Physical   Annual:     Getting at least 3 servings of Calcium per day::  Yes    Bi-annual eye exam::  Yes    Dental care twice a year::  Yes    Sleep apnea or symptoms of sleep apnea::  None    Diet::  Regular (no restrictions)    Frequency of exercise::  2-3 days/week    Duration of exercise::  30-45 minutes    Taking medications regularly::  Yes    Medication side effects::  None    Additional concerns today::  No          PROBLEMS TO ADD ON...    Today's PHQ-2 Score:   PHQ-2 ( 1999 [employer]) 10/11/2017   Q1: Little interest or pleasure in doing things 0   Q2: Feeling down, depressed or hopeless 0   PHQ-2 Score 0   Q1: Little interest or pleasure in doing things Not at all   Q2: Feeling down, depressed or hopeless Not at all   PHQ-2 Score 0       Abuse: Current or Past(Physical, Sexual or Emotional)- No  Do you feel safe in your environment - Yes    Social History   Substance Use Topics     Smoking status: Never Smoker     Smokeless tobacco: Never Used     Alcohol use 0.0 oz/week     0 Standard drinks or equivalent per week      Comment: socially     The patient does not drink >3 drinks per day nor >7 drinks per week.    Reviewed orders with patient.  Reviewed health maintenance and updated orders accordingly - Yes  Labs reviewed in EPIC    Patient under age 50, mutual decision reflected in health maintenance.        Pertinent mammograms are reviewed under the imaging tab.  History of abnormal Pap smear: YES - updated in Problem List and Health Maintenance accordingly    Reviewed and updated as needed this visit by clinical staff  Tobacco  Allergies         Reviewed and updated as needed this visit  "by Provider              ROS:  C: NEGATIVE for fever, chills, change in weight  I: NEGATIVE for worrisome rashes, moles or lesions  E: NEGATIVE for vision changes or irritation  ENT: NEGATIVE for ear, mouth and throat problems  R: NEGATIVE for significant cough or SOB  B: NEGATIVE for masses, tenderness or discharge  CV: NEGATIVE for chest pain, palpitations or peripheral edema  GI: NEGATIVE for nausea, abdominal pain, heartburn, or change in bowel habits  : NEGATIVE for unusual urinary or vaginal symptoms. Periods are regular.  M: NEGATIVE for significant arthralgias or myalgia  N: NEGATIVE for weakness, dizziness or paresthesias  P: NEGATIVE for changes in mood or affect     OBJECTIVE:   /78  Pulse 71  Temp 97.9  F (36.6  C) (Oral)  Ht 5' 5.5\" (1.664 m)  Wt 150 lb 1.6 oz (68.1 kg)  LMP 09/25/2017 (Approximate)  SpO2 99%  BMI 24.6 kg/m2  EXAM:  GENERAL: healthy, alert and no distress  EYES: Eyes grossly normal to inspection, PERRL and conjunctivae and sclerae normal  HENT: ear canals and TM's normal, nose and mouth without ulcers or lesions  NECK: no adenopathy, no asymmetry, masses, or scars and thyroid normal to palpation  RESP: lungs clear to auscultation - no rales, rhonchi or wheezes  BREAST: normal without masses, tenderness or nipple discharge and no palpable axillary masses or adenopathy  CV: regular rate and rhythm, normal S1 S2, no S3 or S4, no murmur, click or rub, no peripheral edema and peripheral pulses strong  ABDOMEN: soft, nontender, no hepatosplenomegaly, no masses and bowel sounds normal   (female): normal female external genitalia, normal urethral meatus, vaginal mucosa pink, moist, well rugated, and normal cervix/adnexa/uterus without masses or discharge/ pap and chlamydia & gonorrhea is sent  MS: no gross musculoskeletal defects noted, no edema  SKIN: no suspicious lesions or rashes  NEURO: Normal strength and tone, mentation intact and speech normal  PSYCH: mentation appears " "normal, affect normal/bright    ASSESSMENT/PLAN:   (Z00.00) Routine general medical examination at a health care facility  (primary encounter diagnosis)  Plan: Please see patient instructions - flu vaccine- offered. Patient declined.    Family history of breast cancer- mom diagnosed and  of breast cancer at age 50. Lauren is encouraged to do self breast exam. She has declined genetic studies.      (R82.902) Papanicolaou smear of cervix with low grade squamous intraepithelial lesion (LGSIL) (aka LSIL)  Comment: Plan: HPV High Risk Types DNA Cervical, Pap imaged         thin layer diagnostic with HPV (select HPV         order below)            (B00.1) Cold sore  Comment: Plan: valACYclovir (VALTREX) 1000 mg tablet, 2 tabs twice daily -single day course  Basic       metabolic panel            (L70.0) Cystic acne  Comment: Plan: levonorgestrel-ethinyl estradiol (VIENVA)         0.1-20 MG-MCG per tablet, spironolactone         (ALDACTONE) 50 MG tablet, Basic metabolic panel            (Z11.3) Screen for STD (sexually transmitted disease)  Comment: Plan: Chlamydia trachomatis PCR, Neisseria         gonorrhoeae PCR, HIV Antigen Antibody Combo,         Anti Treponema              COUNSELING:  Reviewed preventive health counseling, as reflected in patient instructions       Regular exercise       Healthy diet/nutrition         reports that she has never smoked. She has never used smokeless tobacco.    Estimated body mass index is 24.6 kg/(m^2) as calculated from the following:    Height as of this encounter: 5' 5.5\" (1.664 m).    Weight as of this encounter: 150 lb 1.6 oz (68.1 kg).         Counseling Resources:  ATP IV Guidelines  Pooled Cohorts Equation Calculator  Breast Cancer Risk Calculator  FRAX Risk Assessment  ICSI Preventive Guidelines  Dietary Guidelines for Americans,   too.me's MyPlate  ASA Prophylaxis  Lung CA Screening    Nona Cuadra MD  [facility] CLINICAnswers for HPI/ROS submitted " by the patient on 10/11/2017   PHQ-2 Score: 0

## 2017-10-13 NOTE — MR AVS SNAPSHOT
After Visit Summary   10/13/2017    Lauren Fallon    MRN: [id number]           Patient Information     Date Of Birth          1985        Visit Information        Provider Department      10/13/2017 4:30 PM Nona Cuadra MD [facility]        Today's Diagnoses     Routine general medical examination at a health care facility    -  1    Papanicolaou smear of cervix with low grade squamous intraepithelial lesion (LGSIL) (aka LSIL)        Cold sore        Cystic acne        Screen for STD (sexually transmitted disease)          Care Instructions      Preventive Health Recommendations  Female Ages 26 - 39  Yearly exam:   See your health care provider every year in order to    Review health changes.     Discuss preventive care.      Review your medicines if you your doctor has prescribed any.    Until age 30: Get a Pap test every three years (more often if you have had an abnormal result).    After age 30: Talk to your doctor about whether you should have a Pap test every 3 years or have a Pap test with HPV screening every 5 years.   You do not need a Pap test if your uterus was removed (hysterectomy) and you have not had cancer.  You should be tested each year for STDs (sexually transmitted diseases), if you're at risk.   Talk to your provider about how often to have your cholesterol checked.  If you are at risk for diabetes, you should have a diabetes test (fasting glucose).  Shots: Get a flu shot each year. Get a tetanus shot every 10 years.   Nutrition:     Eat at least 5 servings of fruits and vegetables each day.    Eat whole-grain bread, whole-wheat pasta and brown rice instead of white grains and rice.    Talk to your provider about Calcium and Vitamin D.     Lifestyle    Exercise at least 150 minutes a week (30 minutes a day, 5 days of the week). This will help you control your weight and prevent disease.    Limit alcohol to one drink per day.    No smoking.     Wear  "sunscreen to prevent skin cancer.    See your dentist every six months for an exam and cleaning.            Follow-ups after your visit        Who to contact     If you have questions or need follow up information about today's clinic visit or your schedule please contact [facility] directly at [phone].  Normal or non-critical lab and imaging results will be communicated to you by MyChart, letter or phone within 4 business days after the clinic has received the results. If you do not hear from us within 7 days, please contact the clinic through [employer] or phone. If you have a critical or abnormal lab result, we will notify you by phone as soon as possible.  Submit refill requests through [employer] or call your pharmacy and they will forward the refill request to us. Please allow 3 business days for your refill to be completed.          Additional Information About Your Visit        MyChart Information     [employer] gives you secure access to your electronic health record. If you see a primary care provider, you can also send messages to your care team and make appointments. If you have questions, please call your primary care clinic.  If you do not have a primary care provider, please call [phone] and they will assist you.        Care EveryWhere ID     This is your Care EveryWhere ID. This could be used by other organizations to access your [place] medical records  [id number]        Your Vitals Were     Pulse Temperature Height Last Period Pulse Oximetry BMI (Body Mass Index)    71 97.9  F (36.6  C) (Oral) 5' 5.5\" (1.664 m) 09/25/2017 (Approximate) 99% 24.6 kg/m2       Blood Pressure from Last 3 Encounters:   10/13/17 120/78   06/14/17 115/78   02/15/17 110/60    Weight from Last 3 Encounters:   10/13/17 150 lb 1.6 oz (68.1 kg)   06/14/17 138 lb 9.6 oz (62.9 kg)   02/15/17 144 lb (65.3 kg)              We Performed the Following     Anti Treponema     Basic metabolic panel     Chlamydia " trachomatis PCR     HIV Antigen Antibody Combo     HPV High Risk Types DNA Cervical     Neisseria gonorrhoeae PCR     Pap imaged thin layer diagnostic with HPV (select HPV order below)          Today's Medication Changes          These changes are accurate as of: 10/13/17  4:57 PM.  If you have any questions, ask your nurse or doctor.               These medicines have changed or have updated prescriptions.        Dose/Directions    levonorgestrel-ethinyl estradiol 0.1-20 MG-MCG per tablet   Commonly known as:  VIENVA   This may have changed:  See the new instructions.   Used for:  Cystic acne   Changed by:  Nona Cuadra MD        Dose:  1 tablet   Take 1 tablet by mouth daily   Quantity:  90 tablet   Refills:  3            Where to get your medicines      These [facility] to [facility] [address] [place] [address] & [address]  [address] [place] [address]     Phone:  [phone]     levonorgestrel-ethinyl estradiol 0.1-20 MG-MCG per tablet    spironolactone 50 MG tablet    valACYclovir 1000 mg tablet                Primary Care Provider Office Phone # Fax #    Nona Cuadra [phone] [address] [facility] [address]        Equal Access to Services     HANNA AYALA AH: Adal elliotto Sobebo, waaxda luqadaha, qaybta kaalmada adeegyada, jesus jenkins. So [facility] [phone].    ATENCIÓN: Si habla español, tiene a baltazar disposición servicios gratuitos de asistencia lingüística. Llame al [phone].    We comply with applicable federal civil rights laws and [place] laws. We do not discriminate on the basis of race, color, national origin, age, disability, sex, sexual orientation, or gender identity.            Thank you!     Thank you for choosing [facility]  for your care. Our goal is always to provide you with excellent care. Hearing back from our patients is one way  we can continue to improve our services. Please take a few minutes to complete the written survey that you may receive in the mail after your visit with us. Thank you!             Your Updated Medication List - Protect others around you: Learn how to safely use, store and throw away your medicines at www.disposemymeds.org.          This list is accurate as of: 10/13/17  4:57 PM.  Always use your most recent med list.                   Brand Name Dispense Instructions for use Diagnosis    levonorgestrel-ethinyl estradiol 0.1-20 MG-MCG per tablet    VIENVA    90 tablet    Take 1 tablet by mouth daily    Cystic acne       spironolactone 50 MG tablet    ALDACTONE    90 tablet    Take 1 tablet (50 mg) by mouth daily    Cystic acne       valACYclovir 1000 mg tablet    VALTREX    12 tablet    Take 2 tablets (2,000 mg) by mouth 2 times daily At first onset of cold sore for 1 day.    Cold sore

## 2017-10-16 LAB
ANION GAP SERPL CALCULATED.3IONS-SCNC: 11 MMOL/L (ref 3–14)
BUN SERPL-MCNC: 11 MG/DL (ref 7–30)
CALCIUM SERPL-MCNC: 9.4 MG/DL (ref 8.5–10.1)
CHLORIDE SERPL-SCNC: 105 MMOL/L (ref 94–109)
CO2 SERPL-SCNC: 21 MMOL/L (ref 20–32)
CREAT SERPL-MCNC: 0.69 MG/DL (ref 0.52–1.04)
GFR SERPL CREATININE-BSD FRML MDRD: >90 ML/MIN/1.7M2
GLUCOSE SERPL-MCNC: 77 MG/DL (ref 70–99)
HIV 1+2 AB+HIV1 P24 AG SERPL QL IA: NONREACTIVE
POTASSIUM SERPL-SCNC: 4 MMOL/L (ref 3.4–5.3)
SODIUM SERPL-SCNC: 137 MMOL/L (ref 133–144)
T PALLIDUM IGG+IGM SER QL: NEGATIVE

## 2017-10-17 LAB
C TRACH DNA SPEC QL NAA+PROBE: NEGATIVE
N GONORRHOEA DNA SPEC QL NAA+PROBE: NEGATIVE
SPECIMEN SOURCE: NORMAL
SPECIMEN SOURCE: NORMAL

## 2017-10-17 NOTE — PROGRESS NOTES
Negative HIV, human immunodeficiency virus  and syphilis  -Kidney function is normal (Cr, GFR), Sodium is normal, Potassium is normal, Calcium is normal, Glucose is normal (diabetes screening test).

## 2017-10-18 DIAGNOSIS — L70.0 CYSTIC ACNE: ICD-10-CM

## 2017-10-19 LAB
COPATH REPORT: ABNORMAL
PAP: ABNORMAL

## 2017-10-19 RX ORDER — SPIRONOLACTONE 50 MG/1
TABLET, FILM COATED ORAL
Start: 2017-10-19

## 2017-10-19 NOTE — TELEPHONE ENCOUNTER
[facility] requesting refill  Denied  Rx sent to local pharmacy 10/13/2017 for 1 year  Ni OCAMPO RN    Pending Prescriptions:                       Disp   Refills    spironolactone (ALDACTONE) 50 MG tablet [*90 tab*             Sig: TAKE 1 TABLET BY MOUTH  DAILY          Potassium   Date Value Ref Range Status   10/13/2017 4.0 3.4 - 5.3 mmol/L Final     Creatinine   Date Value Ref Range Status   10/13/2017 0.69 0.52 - 1.04 mg/dL Final     BP Readings from Last 3 Encounters:   10/13/17 120/78   06/14/17 115/78   02/15/17 110/60

## 2017-10-24 ENCOUNTER — MYC MEDICAL ADVICE (OUTPATIENT)
Dept: FAMILY MEDICINE | Facility: CLINIC | Age: 32
End: 2017-10-24

## 2017-10-24 LAB
FINAL DIAGNOSIS: ABNORMAL
HPV HR 12 DNA CVX QL NAA+PROBE: POSITIVE
HPV16 DNA SPEC QL NAA+PROBE: NEGATIVE
HPV18 DNA SPEC QL NAA+PROBE: NEGATIVE
SPECIMEN DESCRIPTION: ABNORMAL

## 2017-11-13 ENCOUNTER — OFFICE VISIT (OUTPATIENT)
Dept: FAMILY MEDICINE | Facility: CLINIC | Age: 32
End: 2017-11-13
Payer: COMMERCIAL

## 2017-11-13 VITALS
TEMPERATURE: 98.3 F | RESPIRATION RATE: 16 BRPM | WEIGHT: 144.5 LBS | BODY MASS INDEX: 23.22 KG/M2 | DIASTOLIC BLOOD PRESSURE: 60 MMHG | HEART RATE: 76 BPM | SYSTOLIC BLOOD PRESSURE: 110 MMHG | OXYGEN SATURATION: 100 % | HEIGHT: 66 IN

## 2017-11-13 DIAGNOSIS — Z80.8 FAMILY HISTORY OF MELANOMA: ICD-10-CM

## 2017-11-13 DIAGNOSIS — R41.840 POOR CONCENTRATION: Primary | ICD-10-CM

## 2017-11-13 PROCEDURE — 99213 OFFICE O/P EST LOW 20 MIN: CPT | Performed by: PHYSICIAN ASSISTANT

## 2017-11-13 NOTE — MR AVS SNAPSHOT
After Visit Summary   11/13/2017    Lauren Fallon    MRN: [id number]           Patient Information     Date Of Birth          1985        Visit Information        Provider Department      11/13/2017 4:40 PM Lauri Day PA-C [facility]        Today's Diagnoses     Poor concentration    -  1    Family history of melanoma           Follow-ups after your visit        Additional Services     DERMATOLOGY REFERRAL       Your provider has referred you to: N: [facility] [place] [phone]  [email or web address]    Please be aware that coverage of these services is subject to the terms and limitations of your health insurance plan.  Call member services at your health plan with any benefit or coverage questions.      Please bring the following with you to your appointment:    (1) Any X-Rays, CTs or MRIs which have been performed.  Contact the facility where they were done to arrange for  prior to your scheduled appointment.    (2) List of current medications  (3) This referral request   (4) Any documents/labs given to you for this referral            MENTAL HEALTH REFERRAL       Your provider has referred you to: [facility]: [facility] - ADHD & Bariatric Assessments - Adult ADHD Evaluations - [facility] [phone]   [email or web address]   *Please call to schedule an appointment.    All scheduling is subject to the client's specific insurance plan & benefits, provider/location availability, and provider clinical specialities.  Please arrive 15 minutes early for your first appointment and bring your completed paperwork.    Please be aware that coverage of these services is subject to the terms and limitations of your health insurance plan.  Call member services at your health plan with any benefit or coverage questions.                  Who to contact      "If you have questions or need follow up information about today's clinic visit or your schedule please contact [facility] directly at [phone].  Normal or non-critical lab and imaging results will be communicated to you by MyChart, letter or phone within 4 business days after the clinic has received the results. If you do not hear from us within 7 days, please contact the clinic through GetPricehart or phone. If you have a critical or abnormal lab result, we will notify you by phone as soon as possible.  Submit refill requests through [employer] or call your pharmacy and they will forward the refill request to us. Please allow 3 business days for your refill to be completed.          Additional Information About Your Visit        GetPriceharpluriSelect Information     [employer] gives you secure access to your electronic health record. If you see a primary care provider, you can also send messages to your care team and make appointments. If you have questions, please call your primary care clinic.  If you do not have a primary care provider, please call [phone] and they will assist you.        Care EveryWhere ID     This is your Care EveryWhere ID. This could be used by other organizations to access your [facility] medical records  [id number]        Your Vitals Were     Pulse Temperature Respirations Height Pulse Oximetry BMI (Body Mass Index)    76 98.3  F (36.8  C) (Oral) 16 5' 5.5\" (1.664 m) 100% 23.68 kg/m2       Blood Pressure from Last 3 Encounters:   11/13/17 110/60   10/13/17 120/78   06/14/17 115/78    Weight from Last 3 Encounters:   11/13/17 144 lb 8 oz (65.5 kg)   10/13/17 150 lb 1.6 oz (68.1 kg)   06/14/17 138 lb 9.6 oz (62.9 kg)              We Performed the Following     DERMATOLOGY REFERRAL     MENTAL HEALTH REFERRAL        Primary Care Provider Office Phone # Fax #    Nona Cuadra [phone] [address] [facility] [address]        Equal Access to Services     FIIRO " GAAR : Hadii aad sarah gemini Mijares, wadanyada luqadaha, qaybta kamauricio meade, waxmarilin luther hayjorge dodgemelereynaldo noriega . So [facility] [phone].    ATENCIÓN: Si habla español, tiene a baltazar disposición servicios gratuitos de asistencia lingüística. Llame al [phone].    We comply with applicable federal civil rights laws and [place] laws. We do not discriminate on the basis of race, color, national origin, age, disability, sex, sexual orientation, or gender identity.            Thank you!     Thank you for choosing [facility]  for your care. Our goal is always to provide you with excellent care. Hearing back from our patients is one way we can continue to improve our services. Please take a few minutes to complete the written survey that you may receive in the mail after your visit with us. Thank you!             Your Updated Medication List - Protect others around you: Learn how to safely use, store and throw away your medicines at www.disposemymeds.org.          This list is accurate as of: 11/13/17  5:03 PM.  Always use your most recent med list.                   Brand Name Dispense Instructions for use Diagnosis    levonorgestrel-ethinyl estradiol 0.1-20 MG-MCG per tablet    VIENVA    90 tablet    Take 1 tablet by mouth daily    Cystic acne       spironolactone 50 MG tablet    ALDACTONE    90 tablet    Take 1 tablet (50 mg) by mouth daily    Cystic acne       valACYclovir 1000 mg tablet    VALTREX    12 tablet    Take 2 tablets (2,000 mg) by mouth 2 times daily At first onset of cold sore for 1 day.    Cold sore

## 2017-11-13 NOTE — PROGRESS NOTES
SUBJECTIVE:   Lauren Fallon is a 31 year old female who presents to clinic today for the following health issues:      Trouble concentrating at work and lesion on stomach      Duration: long time    Description (location/character/radiation):     Intensity:      Accompanying signs and symptoms:     History (similar episodes/previous evaluation): None    Precipitating or alleviating factors: None    Therapies tried and outcome: None             Problem list and histories reviewed & adjusted, as indicated.  Additional history: 30 y/o new to me female here to discuss some ongoing issues.  She did have her well exam last month, and did not mention at that time.  This is not something that she has really ever looked into.  She did well in high school, and did ok in undergrad.  She did get graduate degree and she was thinking about [employer], and noticed her grades were not great.  She does work in finance, and finds that she has trouble with long term goal and staying motivated.  She feels that she has had symptoms for while, but has just tolerated.    She does find that caffeine does help, but does not like to consume too much.      She also has a skin lesion for the last year.  Has not really changed during that time.  She has not done any treatment.  She does have family history of melanoma.    She does have hx of cystic acne and on aldactone.  She has had some some lesions removed, all benign.    BP Readings from Last 3 Encounters:   11/13/17 110/60   10/13/17 120/78   06/14/17 115/78    Wt Readings from Last 3 Encounters:   11/13/17 144 lb 8 oz (65.5 kg)   10/13/17 150 lb 1.6 oz (68.1 kg)   06/14/17 138 lb 9.6 oz (62.9 kg)                      Reviewed and updated as needed this visit by clinical staffTobacco  Allergies  Meds       Reviewed and updated as needed this visit by Provider         ROS:  Constitutional, HEENT, cardiovascular, pulmonary, gi and gu systems are negative, except as otherwise  "noted.      OBJECTIVE:   /60 (BP Location: Right arm, Cuff Size: Adult Regular)  Pulse 76  Temp 98.3  F (36.8  C) (Oral)  Resp 16  Ht 5' 5.5\" (1.664 m)  Wt 144 lb 8 oz (65.5 kg)  SpO2 100%  BMI 23.68 kg/m2  Body mass index is 23.68 kg/(m^2).  GENERAL: alert and no distress  EYES: Eyes grossly normal to inspection  HENT: ear canals and TM's normal, nose and mouth without ulcers or lesions  NECK: no adenopathy, no asymmetry, masses, or scars and thyroid normal to palpation  RESP: lungs clear to auscultation - no rales, rhonchi or wheezes  CV: regular rate and rhythm, normal S1 S2, no S3 or S4, no murmur, click or rub, no peripheral edema and peripheral pulses strong  SKIN: small hyperpigmented macule, consistent with lentigo over abdomen.  PSYCH: mentation appears normal, affect normal/bright    Diagnostic Test Results:  none     ASSESSMENT/PLAN:             1. Poor concentration  Will get patient further evaluated for ADHD.  - MENTAL HEALTH REFERRAL    2. Family history of melanoma  The lesion of concern has benign appearance, but with family history of melanoma, it is worth establishing with dermatology.  - DERMATOLOGY REFERRAL        Lauri Day PA-C  [facility]    "

## 2017-11-13 NOTE — NURSING NOTE
"Chief Complaint   Patient presents with     Consult     trouble concentrating at work     Derm Problem     mole on stomach     initial /60 (BP Location: Right arm, Cuff Size: Adult Regular)  Pulse 76  Temp 98.3  F (36.8  C) (Oral)  Resp 16  Ht 5' 5.5\" (1.664 m)  Wt 144 lb 8 oz (65.5 kg)  SpO2 100%  BMI 23.68 kg/m2 Estimated body mass index is 23.68 kg/(m^2) as calculated from the following:    Height as of this encounter: 5' 5.5\" (1.664 m).    Weight as of this encounter: 144 lb 8 oz (65.5 kg).  BP completed using cuff size: regular.  R arm      Health Maintenance that is potentially due pending provider review:  NONE    n/a    Tim Yap ma  "

## 2018-05-09 ENCOUNTER — OFFICE VISIT (OUTPATIENT)
Dept: FAMILY MEDICINE | Facility: CLINIC | Age: 33
End: 2018-05-09
Payer: COMMERCIAL

## 2018-05-09 VITALS
RESPIRATION RATE: 16 BRPM | DIASTOLIC BLOOD PRESSURE: 70 MMHG | OXYGEN SATURATION: 99 % | TEMPERATURE: 98.3 F | WEIGHT: 143.8 LBS | SYSTOLIC BLOOD PRESSURE: 110 MMHG | BODY MASS INDEX: 23.57 KG/M2

## 2018-05-09 DIAGNOSIS — R87.612 PAPANICOLAOU SMEAR OF CERVIX WITH LOW GRADE SQUAMOUS INTRAEPITHELIAL LESION (LGSIL): Primary | ICD-10-CM

## 2018-05-09 PROCEDURE — 88141 CYTOPATH C/V INTERPRET: CPT | Performed by: FAMILY MEDICINE

## 2018-05-09 PROCEDURE — 87624 HPV HI-RISK TYP POOLED RSLT: CPT | Performed by: FAMILY MEDICINE

## 2018-05-09 PROCEDURE — 88175 CYTOPATH C/V AUTO FLUID REDO: CPT | Performed by: FAMILY MEDICINE

## 2018-05-09 PROCEDURE — 99213 OFFICE O/P EST LOW 20 MIN: CPT | Performed by: FAMILY MEDICINE

## 2018-05-09 NOTE — MR AVS SNAPSHOT
After Visit Summary   5/9/2018    Lauren Fallon    MRN: [id number]           Patient Information     Date Of Birth          1985        Visit Information        Provider Department      5/9/2018 3:30 PM Lori Meadows MD [facility]        Today's Diagnoses     Papanicolaou smear of cervix with low grade squamous intraepithelial lesion (LGSIL) (aka LSIL)    -  1       Follow-ups after your visit        Who to contact     If you have questions or need follow up information about today's clinic visit or your schedule please contact [facility] directly at [phone].  Normal or non-critical lab and imaging results will be communicated to you by MyChart, letter or phone within 4 business days after the clinic has received the results. If you do not hear from us within 7 days, please contact the clinic through Com[employer] or phone. If you have a critical or abnormal lab result, we will notify you by phone as soon as possible.  Submit refill requests through [employer] or call your pharmacy and they will forward the refill request to us. Please allow 3 business days for your refill to be completed.          Additional Information About Your Visit        MyChart Information     [employer] gives you secure access to your electronic health record. If you see a primary care provider, you can also send messages to your care team and make appointments. If you have questions, please call your primary care clinic.  If you do not have a primary care provider, please call [phone] and they will assist you.        Care EveryWhere ID     This is your Care EveryWhere ID. This could be used by other organizations to access your [place] medical records  [id number]        Your Vitals Were     Temperature Respirations Last Period Pulse Oximetry Breastfeeding? BMI (Body Mass Index)    98.3  F (36.8  C) (Tympanic) 16 04/22/2018 99% No 23.57 kg/m2       Blood Pressure from Last 3  Encounters:   05/09/18 110/70   11/13/17 110/60   10/13/17 120/78    Weight from Last 3 Encounters:   05/09/18 143 lb 12.8 oz (65.2 kg)   11/13/17 144 lb 8 oz (65.5 kg)   10/13/17 150 lb 1.6 oz (68.1 kg)              We Performed the Following     HPV High Risk Types DNA Cervical     Pap imaged thin layer diagnostic with HPV (select HPV order below)        Primary Care Provider Office Phone # Fax #    Nona Eduardo Cuadra [phone] [address] [facility] [address]        Equal Access to Services     HALLE AYALA : Hadii rick Mijares, wadanyada napoleon, mio kamauricio meade, jesus jenkins. So [facility] [phone].    ATENCIÓN: Si habla español, tiene a baltazar disposición servicios gratuitos de asistencia lingüística. Llame al [phone].    We comply with applicable federal civil rights laws and [place] laws. We do not discriminate on the basis of race, color, national origin, age, disability, sex, sexual orientation, or gender identity.            Thank you!     Thank you for choosing [facility]  for your care. Our goal is always to provide you with excellent care. Hearing back from our patients is one way we can continue to improve our services. Please take a few minutes to complete the written survey that you may receive in the mail after your visit with us. Thank you!             Your Updated Medication List - Protect others around you: Learn how to safely use, store and throw away your medicines at www.disposemymeds.org.          This list is accurate as of 5/9/18  4:23 PM.  Always use your most recent med list.                   Brand Name Dispense Instructions for use Diagnosis    levonorgestrel-ethinyl estradiol 0.1-20 MG-MCG per tablet    VIENVA    90 tablet    Take 1 tablet by mouth daily    Cystic acne       spironolactone 50 MG tablet    ALDACTONE    90 tablet    Take 1 tablet (50 mg) by mouth daily    Cystic acne        valACYclovir 1000 mg tablet    VALTREX    12 tablet    Take 2 tablets (2,000 mg) by mouth 2 times daily At first onset of cold sore for 1 day.    Cold sore

## 2018-05-09 NOTE — PROGRESS NOTES
SUBJECTIVE:   Lauren Fallon is a 32 year old female who presents to clinic today for the following health issues:  Pt is here today to repeat a pap smear. Pt was informed that she would need to repeat a pap smear every 6 months due to an abnormal pap smear.     Reviewed pap/colposcopy results with pt as below...  She preferred waiting to do a 6mo f/u pap to doing another colposcopy.      Papanicolaou smear of cervix with low grade squamous intraepithelial lesion (LGSIL) (aka LSIL) 10/01/2015     Priority: Medium     10/8/15: LSIL. Plan Teller  12/9/15:  Teller- WNL. Plan cotest pap & HPV in 1 year  11/09/16: diagnostic LSIL pap, + HR HPV 16 and other. Plan: Teller per provider.  01/31/17: Teller Bx LSIL AVIVA I ECC: LSIL AVIVA I. Plan Cotest in 1 year per provider.  10/13/17: LSIL pap, + HR HPV (not 16 or 18) result. Plan Teller per provider.  01/23/18: Teller not done. Tracking updated for 6 mo colp/pap.            Problem list and histories reviewed & adjusted, as indicated.  Additional history: as documented    Current Outpatient Prescriptions   Medication Sig Dispense Refill     levonorgestrel-ethinyl estradiol (VIENVA) 0.1-20 MG-MCG per tablet Take 1 tablet by mouth daily 90 tablet 3     spironolactone (ALDACTONE) 50 MG tablet Take 1 tablet (50 mg) by mouth daily 90 tablet 3     valACYclovir (VALTREX) 1000 mg tablet Take 2 tablets (2,000 mg) by mouth 2 times daily At first onset of cold sore for 1 day. 12 tablet 3     No Known Allergies       Labs reviewed in EPIC    Reviewed and updated as needed this visit by clinical staff  Tobacco  Allergies  Meds  Problems  Med Hx  Surg Hx  Fam Hx       Reviewed and updated as needed this visit by Provider  Allergies  Meds  Problems         ROS:  Constitutional, HEENT, cardiovascular, pulmonary, gi and gu systems are negative, except as otherwise noted.    OBJECTIVE:     /70  Temp 98.3  F (36.8  C) (Tympanic)  Resp 16  Wt 143 lb 12.8 oz (65.2 kg)  LMP  04/22/2018  SpO2 99%  Breastfeeding? No  BMI 23.57 kg/m2  Body mass index is 23.57 kg/(m^2).  GEN: pleasant, alert, oriented, nad   Pelvic: normal external genitalia, normal vaginal mucosa and cervix, slight vaginal discharge, no sign of irritation or lesions       ASSESSMENT/PLAN:       ICD-10-CM    1. Papanicolaou smear of cervix with low grade squamous intraepithelial lesion (LGSIL) (aka LSIL) R87.612 Pap imaged thin layer diagnostic with HPV (select HPV order below)     HPV High Risk Types DNA Cervical     31yo with h/o abnl paps and AVIVA 1 on 1/17 colposcopy.  LSIL pap in 10/17- rec another colposcopy.  Pt preferred 6mo f/u, so is here for pap recheck today.  Reviewed pap/colposcopy path results and recommendations.  Hopefully showing some clearing, but if not, rec colposcopy if still abnl.      Lori Meadows MD  [facility]

## 2018-05-17 LAB
COPATH REPORT: ABNORMAL
PAP: ABNORMAL

## 2018-05-21 ENCOUNTER — MYC MEDICAL ADVICE (OUTPATIENT)
Dept: FAMILY MEDICINE | Facility: CLINIC | Age: 33
End: 2018-05-21

## 2018-05-21 LAB
FINAL DIAGNOSIS: ABNORMAL
HPV HR 12 DNA CVX QL NAA+PROBE: POSITIVE
HPV16 DNA SPEC QL NAA+PROBE: NEGATIVE
HPV18 DNA SPEC QL NAA+PROBE: NEGATIVE
SPECIMEN DESCRIPTION: ABNORMAL
SPECIMEN SOURCE CVX/VAG CYTO: ABNORMAL

## 2018-05-21 NOTE — TELEPHONE ENCOUNTER
Pap tracking nurse called patient and left message today at 12:45    5/9/18 LSIL pap, + HR HPV (not 16 or 18). Plan colp due by 8/9/18  Left message for patient to return my call to [phone]  PROMISE RichardN, RN   Pap Tracking Nurse    Jenna Salazar RN

## 2018-08-07 ENCOUNTER — OFFICE VISIT (OUTPATIENT)
Dept: FAMILY MEDICINE | Facility: CLINIC | Age: 33
End: 2018-08-07
Payer: COMMERCIAL

## 2018-08-07 VITALS
SYSTOLIC BLOOD PRESSURE: 125 MMHG | RESPIRATION RATE: 14 BRPM | TEMPERATURE: 98.4 F | HEIGHT: 66 IN | HEART RATE: 64 BPM | DIASTOLIC BLOOD PRESSURE: 77 MMHG | OXYGEN SATURATION: 97 % | WEIGHT: 142 LBS | BODY MASS INDEX: 22.82 KG/M2

## 2018-08-07 DIAGNOSIS — R05.8 POST-VIRAL COUGH SYNDROME: Primary | ICD-10-CM

## 2018-08-07 PROCEDURE — 99213 OFFICE O/P EST LOW 20 MIN: CPT | Performed by: PHYSICIAN ASSISTANT

## 2018-08-07 RX ORDER — ALBUTEROL SULFATE 90 UG/1
2 AEROSOL, METERED RESPIRATORY (INHALATION) EVERY 6 HOURS PRN
Qty: 1 INHALER | Refills: 0 | Status: SHIPPED | OUTPATIENT
Start: 2018-08-07 | End: 2018-10-19

## 2018-08-07 NOTE — NURSING NOTE
"Chief Complaint   Patient presents with     Cough       Initial /77  Pulse 64  Temp 98.4  F (36.9  C) (Oral)  Resp 14  Ht 5' 5.5\" (1.664 m)  Wt 142 lb (64.4 kg)  SpO2 97%  Breastfeeding? No  BMI 23.27 kg/m2 Estimated body mass index is 23.27 kg/(m^2) as calculated from the following:    Height as of this encounter: 5' 5.5\" (1.664 m).    Weight as of this encounter: 142 lb (64.4 kg).  BP completed using cuff size: regular    Health Maintenance that is potentially due pending provider review:  There are no preventive care reminders to display for this patient.      n/a  "

## 2018-08-07 NOTE — PROGRESS NOTES
"  SUBJECTIVE:   Lauren Fallon is a 32 year old female who presents to clinic today for the following health issues:      RESPIRATORY SYMPTOMS      Duration: 2 weeks    Description  nasal congestion, cough-no sputum, wheezing, headache, fatigue/malaise and hoarse voice    Severity: moderate    Accompanying signs and symptoms: None    History (predisposing factors):  Person in office did have a cough for a while    Precipitating or alleviating factors: None    Therapies tried and outcome:  rest and fluids          Problem list and histories reviewed & adjusted, as indicated.  Additional history: started as a cold, but the cough is just lingering.  She is not able to exercise/yoga, as she gets coughing spells.  Does not really feel all that bad.    BP Readings from Last 3 Encounters:   08/07/18 125/77   05/09/18 110/70   11/13/17 110/60    Wt Readings from Last 3 Encounters:   08/07/18 142 lb (64.4 kg)   05/09/18 143 lb 12.8 oz (65.2 kg)   11/13/17 144 lb 8 oz (65.5 kg)                    Reviewed and updated as needed this visit by clinical staff  Tobacco  Allergies  Meds  Med Hx  Surg Hx  Fam Hx  Soc Hx      Reviewed and updated as needed this visit by Provider         ROS:  Constitutional, HEENT, cardiovascular, pulmonary, gi and gu systems are negative, except as otherwise noted.    OBJECTIVE:     /77  Pulse 64  Temp 98.4  F (36.9  C) (Oral)  Resp 14  Ht 5' 5.5\" (1.664 m)  Wt 142 lb (64.4 kg)  SpO2 97%  Breastfeeding? No  BMI 23.27 kg/m2  Body mass index is 23.27 kg/(m^2).  GENERAL: alert and no distress  EYES: Eyes grossly normal to inspection  HENT: ear canals and TM's normal, nose and mouth without ulcers or lesions  RESP: lungs clear to auscultation - no rales, rhonchi or wheezes  CV: regular rate and rhythm, normal S1 S2, no S3 or S4, no murmur, click or rub, no peripheral edema and peripheral pulses strong  PSYCH: mentation appears normal, affect normal/bright    Diagnostic Test " Results:  none     ASSESSMENT/PLAN:             1. Post-viral cough syndrome  otc cough syrup as well.  If symptoms persist or worsen into end of week, encourage patient to contact us to determine next step.  - albuterol (PROAIR HFA/PROVENTIL HFA/VENTOLIN HFA) 108 (90 Base) MCG/ACT Inhaler; Inhale 2 puffs into the lungs every 6 hours as needed for shortness of breath / dyspnea or wheezing  Dispense: 1 Inhaler; Refill: 0        Lauri Day PA-C  [facility]

## 2018-08-07 NOTE — MR AVS SNAPSHOT
After Visit Summary   8/7/2018    Lauren Fallon    MRN: [id number]           Patient Information     Date Of Birth          1985        Visit Information        Provider Department      8/7/2018 11:40 AM Lauri Day PA-C [facility]        Today's Diagnoses     Post-viral cough syndrome    -  1       Follow-ups after your visit        Your next 10 appointments already scheduled     Aug 14, 2018  3:00 PM CDT   Procedure with Dayanna Jarrett MD, [facility] RESOURCE PROCEDURE   [facility] ([facility])    [facility] [address]  [address]  [facility] [address] [phone]              Who to contact     If you have questions or need follow up information about today's clinic visit or your schedule please contact [facility] directly at [phone].  Normal or non-critical lab and imaging results will be communicated to you by MyChart, letter or phone within 4 business days after the clinic has received the results. If you do not hear from us within 7 days, please contact the clinic through MyChart or phone. If you have a critical or abnormal lab result, we will notify you by phone as soon as possible.  Submit refill requests through [employer] or call your pharmacy and they will forward the refill request to us. Please allow 3 business days for your refill to be completed.          Additional Information About Your Visit        MyChart Information     [employer] gives you secure access to your electronic health record. If you see a primary care provider, you can also send messages to your care team and make appointments. If you have questions, please call your primary care clinic.  If you do not have a primary care provider, please call [phone] and they will assist you.        Care EveryWhere ID     This is your Care EveryWhere ID. This could be used by other organizations to access your [place]  "medical records  [id number]        Your Vitals Were     Pulse Temperature Respirations Height Pulse Oximetry Breastfeeding?    64 98.4  F (36.9  C) (Oral) 14 5' 5.5\" (1.664 m) 97% No    BMI (Body Mass Index)                   23.27 kg/m2            Blood Pressure from Last 3 Encounters:   08/07/18 125/77   05/09/18 110/70   11/13/17 110/60    Weight from Last 3 Encounters:   08/07/18 142 lb (64.4 kg)   05/09/18 143 lb 12.8 oz (65.2 kg)   11/13/17 144 lb 8 oz (65.5 kg)              Today, you had the following     No orders found for display         Today's Medication Changes          These changes are accurate as of 8/7/18 11:51 AM.  If you have any questions, ask your nurse or doctor.               Start taking these medicines.        Dose/Directions    albuterol 108 (90 Base) MCG/ACT Inhaler   Commonly known as:  PROAIR HFA/PROVENTIL HFA/VENTOLIN HFA   Used for:  Post-viral cough syndrome   Started by:  Lauri Day PA-C        Dose:  2 puff   Inhale 2 puffs into the lungs every 6 hours as needed for shortness of breath / dyspnea or wheezing   Quantity:  1 Inhaler   Refills:  0            Where to get your medicines      These medications were sent to [facility] [address] [place] [address] & [facility]  [address] [place] [address]     Phone:  [phone]     albuterol 108 (90 Base) MCG/ACT Inhaler                Primary Care Provider Office Phone # Fax #    Nona Cuadra [phone] [address] [facility] [address]        Equal Access to Services     HALLE AYALA : Adal Mijares, stella bender, jesus mendez So [facility] [phone].    ATENCIÓN: Si habla español, tiene a baltazar disposición servicios gratuitos de asistencia lingüística. Llame al [phone].    We comply with applicable federal civil rights laws and [place] laws. We do not " discriminate on the basis of race, color, national origin, age, disability, sex, sexual orientation, or gender identity.            Thank you!     Thank you for choosing [facility]  for your care. Our goal is always to provide you with excellent care. Hearing back from our patients is one way we can continue to improve our services. Please take a few minutes to complete the written survey that you may receive in the mail after your visit with us. Thank you!             Your Updated Medication List - Protect others around you: Learn how to safely use, store and throw away your medicines at www.disposemymeds.org.          This list is accurate as of 8/7/18 11:51 AM.  Always use your most recent med list.                   Brand Name Dispense Instructions for use Diagnosis    albuterol 108 (90 Base) MCG/ACT Inhaler    PROAIR HFA/PROVENTIL HFA/VENTOLIN HFA    1 Inhaler    Inhale 2 puffs into the lungs every 6 hours as needed for shortness of breath / dyspnea or wheezing    Post-viral cough syndrome       levonorgestrel-ethinyl estradiol 0.1-20 MG-MCG per tablet    VIENVA    90 tablet    Take 1 tablet by mouth daily    Cystic acne       spironolactone 50 MG tablet    ALDACTONE    90 tablet    Take 1 tablet (50 mg) by mouth daily    Cystic acne       valACYclovir 1000 mg tablet    VALTREX    12 tablet    Take 2 tablets (2,000 mg) by mouth 2 times daily At first onset of cold sore for 1 day.    Cold sore

## 2018-08-09 ENCOUNTER — MYC MEDICAL ADVICE (OUTPATIENT)
Dept: FAMILY MEDICINE | Facility: CLINIC | Age: 33
End: 2018-08-09

## 2018-08-09 DIAGNOSIS — J06.9 UPPER RESPIRATORY TRACT INFECTION, UNSPECIFIED TYPE: Primary | ICD-10-CM

## 2018-08-09 RX ORDER — BENZONATATE 200 MG/1
200 CAPSULE ORAL 3 TIMES DAILY PRN
Qty: 21 CAPSULE | Refills: 0 | Status: SHIPPED | OUTPATIENT
Start: 2018-08-09 | End: 2018-10-19

## 2018-08-09 RX ORDER — AZITHROMYCIN 250 MG/1
TABLET, FILM COATED ORAL
Qty: 6 TABLET | Refills: 0 | Status: SHIPPED | OUTPATIENT
Start: 2018-08-09 | End: 2018-10-19

## 2018-08-09 NOTE — TELEPHONE ENCOUNTER
Due to duration, did call in antibiotic to treat for assumed bacterial infection.  Also called in something different for cough.    Ihsan Day PA-C

## 2018-08-09 NOTE — TELEPHONE ENCOUNTER
JS  Patient saw you 8/7  Please see [employer] message below.  Thanks, Jenna Salazar RN

## 2018-08-21 ENCOUNTER — OFFICE VISIT (OUTPATIENT)
Dept: OBGYN | Facility: CLINIC | Age: 33
End: 2018-08-21
Attending: OBSTETRICS & GYNECOLOGY
Payer: COMMERCIAL

## 2018-08-21 VITALS
WEIGHT: 143.6 LBS | BODY MASS INDEX: 23.08 KG/M2 | DIASTOLIC BLOOD PRESSURE: 78 MMHG | HEIGHT: 66 IN | HEART RATE: 69 BPM | SYSTOLIC BLOOD PRESSURE: 138 MMHG

## 2018-08-21 DIAGNOSIS — Z01.812 PRE-PROCEDURE LAB EXAM: Primary | ICD-10-CM

## 2018-08-21 DIAGNOSIS — R87.612 PAPANICOLAOU SMEAR OF CERVIX WITH LOW GRADE SQUAMOUS INTRAEPITHELIAL LESION (LGSIL): ICD-10-CM

## 2018-08-21 LAB
HCG UR QL: NEGATIVE
INTERNAL QC OK POCT: YES

## 2018-08-21 PROCEDURE — 57454 BX/CURETT OF CERVIX W/SCOPE: CPT | Mod: ZF | Performed by: OBSTETRICS & GYNECOLOGY

## 2018-08-21 PROCEDURE — 88305 TISSUE EXAM BY PATHOLOGIST: CPT | Performed by: OBSTETRICS & GYNECOLOGY

## 2018-08-21 PROCEDURE — 81025 URINE PREGNANCY TEST: CPT | Mod: ZF | Performed by: OBSTETRICS & GYNECOLOGY

## 2018-08-21 NOTE — PROGRESS NOTES
"  Colposcopy Visit/Procedure Note:    Lauren Fallon is an 32 year old, , who comes in for diagnosis of abnormal pap screen and positive HPV.     Menstrual History:  Menstrual History 10/13/2017 2018 2018   LAST MENSTRUAL PERIOD 2017       Lab Results   Component Value Date    PAP LSIL 2018    PAP LSIL 10/13/2017    PAP LSIL 2016       Last   Lab Results   Component Value Date    HPV16 Negative 2018     Last   Lab Results   Component Value Date    HPV18 Negative 2018     Last   Lab Results   Component Value Date    HRHPV Positive 2018       Dates/results of previous cervical pathology: see above        Dates of previous cervical pathology treatment: no treatment    History   Smoking Status     Never Smoker   Smokeless Tobacco     Never Used       Allergies as of 2018     (No Known Allergies)        Colposcopy Procedure:    Consent:  Details of the colposcopic procedure were reviewed. Risks, benefits of treatment, and alternate forms of evaluation were discussed.  Patient's questions were elicited and answered.   Written consent was obtained and scanned into medical record.     Verification of Procedure  Just before the procedure begins, through verbal and active participation of team members, I verified:   Initials   Patient Name smd   Patient  smd   Procedure to be performed smd       OBJECTIVE: /78  Pulse 69  Ht 1.664 m (5' 5.5\")  Wt 65.1 kg (143 lb 9.6 oz)  LMP 2018 (Exact Date)  BMI 23.53 kg/m2    Pelvic Exam:  EG/BUS: Pubic hair shaven. Bartholin's, Urethra, Martha's glands are normal.   Vagina: moist, pink, rugae with creamy, white and odorlesssecretions  Cervix: no lesions  Rectum:anus normal    PROCEDURE:      Acetic acid and/or Lugol's solution applied to cervix.  Colposcopic exam of the cervix and apex of the vagina was conducted in the usual fashion.     Findings:  SCJ was NOT seen entirely- [facility] " unable to clearly see. So, the exam UNsatisfactory.    There were no visible lesions    Biopsies were obtained at 8 o'clock and placed in labeled Formalin Jar.    ECC: was obtained and placed in labeled Formalin Jar.      Assessment: Normal exam without visible pathology    Plan:   Based on Bx results.     Biopsies sent to pathology.  Will contact patient with results and recommended follow-up plan.     Patient advised to contact clinic with heavy vaginal bleeding, fever over 101 degrees F, or any other concerns.    Advised that evaluation of sexual contacts is NOT warranted as she can not get the same virus again. Risk of exposure to a NEW virus is possible with partner change.    Verbalized understanding and agreement with plan.    Viry Wade MD, FACOG  [facility]  OB/GYN    8/21/2018  11:18 AM

## 2018-08-21 NOTE — LETTER
"2018       RE: Lauren Fallon  [address]  [facility] [address]     Dear Colleague,    Thank you for referring your patient, Lauren Fallon, to the [facility] at [facility]. Please see a copy of my visit note below.      Colposcopy Visit/Procedure Note:    Lauren Fallon is an 32 year old, , who comes in for diagnosis of abnormal pap screen and positive HPV.     Menstrual History:  Menstrual History 10/13/2017 2018 2018   LAST MENSTRUAL PERIOD 2017       Lab Results   Component Value Date    PAP LSIL 2018    PAP LSIL 10/13/2017    PAP LSIL 2016       Last   Lab Results   Component Value Date    HPV16 Negative 2018     Last   Lab Results   Component Value Date    HPV18 Negative 2018     Last   Lab Results   Component Value Date    HRHPV Positive 2018       Dates/results of previous cervical pathology: see above        Dates of previous cervical pathology treatment: no treatment    History   Smoking Status     Never Smoker   Smokeless Tobacco     Never Used       Allergies as of 2018     (No Known Allergies)        Colposcopy Procedure:    Consent:  Details of the colposcopic procedure were reviewed. Risks, benefits of treatment, and alternate forms of evaluation were discussed.  Patient's questions were elicited and answered.   Written consent was obtained and scanned into medical record.     Verification of Procedure  Just before the procedure begins, through verbal and active participation of team members, I verified:   Initials   Patient Name smd   Patient  smd   Procedure to be performed smd       OBJECTIVE: /78  Pulse 69  Ht 1.664 m (5' 5.5\")  Wt 65.1 kg (143 lb 9.6 oz)  LMP 2018 (Exact Date)  BMI 23.53 kg/m2    Pelvic Exam:  EG/BUS: Pubic hair shaven. Bartholin's, Urethra, Etta's glands are normal.   Vagina: " moist, pink, rugae with creamy, white and odorlesssecretions  Cervix: no lesions  Rectum:anus normal    PROCEDURE:      Acetic acid and/or Lugol's solution applied to cervix.  Colposcopic exam of the cervix and apex of the vagina was conducted in the usual fashion.     Findings:  SCJ was NOT seen entirely- 9oclock area unable to clearly see. So, the exam UNsatisfactory.    There were no visible lesions    Biopsies were obtained at 8 o'clock and placed in labeled Formalin Jar.    ECC: was obtained and placed in labeled Formalin Jar.      Assessment: Normal exam without visible pathology    Plan:   Based on Bx results.     Biopsies sent to pathology.  Will contact patient with results and recommended follow-up plan.     Patient advised to contact clinic with heavy vaginal bleeding, fever over 101 degrees F, or any other concerns.    Advised that evaluation of sexual contacts is NOT warranted as she can not get the same virus again. Risk of exposure to a NEW virus is possible with partner change.    Verbalized understanding and agreement with plan.      Again, thank you for allowing me to participate in the care of your patient.      Sincerely,    Viry Wade MD

## 2018-08-21 NOTE — PATIENT INSTRUCTIONS

## 2018-08-21 NOTE — MR AVS SNAPSHOT
After Visit Summary   8/21/2018    Lauren Fallon    MRN: [id number]           Patient Information     Date Of Birth          1985        Visit Information        Provider Department      8/21/2018 10:15 AM Viry Wade MD; [email or web address]] [facility] RESOURCE PROCEDURE [facility]        Today's Diagnoses     Pre-procedure lab exam    -  1    Papanicolaou smear of cervix with low grade squamous intraepithelial lesion (LGSIL)          Care Instructions      Colposcopy Post-Procedure Patient Instructions      You may experience any of the following for a couple of days following colposcopy:    Mild cramping    Vaginal bleeding     Vaginal discharge that looks black and clumpy    Please call your healthcare provider if you have any of the following symptoms:    Fever--Temperature greater than 100 degrees    Cramping after 48 hours    Bleeding heavier than a normal period in the first 24-48 hours    If you are bleeding and soaking more than one pad an hour    Or any other worrisome problems.    We recommend that:    You use pads, not tampons for the bleeding.    You may resume sexual activity in 2-3 days, or after bleeding stops.    You may use Tylenol or ibuprofen (Motrin or Advil) for any discomfort.      Viry Wade MD          Follow-ups after your visit        Follow-up notes from your care team     Return if symptoms worsen or fail to improve, for pending Pathology results.      Who to contact     Please call your clinic at [phone] to:    Ask questions about your health    Make or cancel appointments    Discuss your medicines    Learn about your test results    Speak to your doctor            Additional Information About Your Visit        MyChart Information     [employer] gives you secure access to your electronic health record. If you see a primary care provider, you can also send messages to your care team and make appointments. If you have questions, please call  "your primary care clinic.  If you do not have a primary care provider, please call [phone] and they will assist you.      [employer] is an electronic gateway that provides easy, online access to your medical records. With [employer], you can request a clinic appointment, read your test results, renew a prescription or communicate with your care team.     To access your existing account, please contact your [facility] or call [phone] for assistance.        Care EveryWhere ID     This is your Care EveryWhere ID. This could be used by other organizations to access your [place] medical records  [id number]        Your Vitals Were     Pulse Height Last Period BMI (Body Mass Index)          69 1.664 m (5' 5.5\") 08/05/2018 (Exact Date) 23.53 kg/m2         Blood Pressure from Last 3 Encounters:   08/21/18 138/78   08/07/18 125/77   05/09/18 110/70    Weight from Last 3 Encounters:   08/21/18 65.1 kg (143 lb 9.6 oz)   08/07/18 64.4 kg (142 lb)   05/09/18 65.2 kg (143 lb 12.8 oz)              We Performed the Following     Colposcopy Cervix/Upper Vagina with Biopsy of Cervix/Endocervical Curettage [79313]     hCG qual urine POCT     Surgical pathology exam [[id number]]        Primary Care Provider Office Phone # Fax #    Nona Eduardo Cuadra [phone] [address] [facility] [address]        Equal Access to Services     HANNA AYALA : Hadii aad ku hadasho Soomaali, waaxda luqadaha, qaybta kaalmada adeegyada, jesus jenkins. So [facility] [phone].    ATENCIÓN: Si habla noyañol, tiene a baltazar disposición servicios gratuitos de asistencia lingüística. Llame al [phone].    We comply with applicable federal civil rights laws and [place] laws. We do not discriminate on the basis of race, color, national origin, age, disability, sex, sexual orientation, or gender identity.            Thank you!     [facility]  [facility] your care. Our goal is always to provide you with excellent care. Hearing back from our patients is one way we can continue to improve our services. Please take a few minutes to complete the written survey that you may receive in the mail after your visit with us. Thank you!             Your Updated Medication List - Protect others around you: Learn how to safely use, store and throw away your medicines at www.disposemymeds.org.          This list is accurate as of 8/21/18 11:20 AM.  Always use your most recent med list.                   Brand Name Dispense Instructions for use Diagnosis    albuterol 108 (90 Base) MCG/ACT inhaler    PROAIR HFA/PROVENTIL HFA/VENTOLIN HFA    1 Inhaler    Inhale 2 puffs into the lungs every 6 hours as needed for shortness of breath / dyspnea or wheezing    Post-viral cough syndrome       azithromycin 250 MG tablet    ZITHROMAX    6 tablet    Two tablets first day, then one tablet daily for four days.    Upper respiratory tract infection, unspecified type       benzonatate 200 MG capsule    TESSALON    21 capsule    Take 1 capsule (200 mg) by mouth 3 times daily as needed for cough    Upper respiratory tract infection, unspecified type       levonorgestrel-ethinyl estradiol 0.1-20 MG-MCG per tablet    VIENVA    90 tablet    Take 1 tablet by mouth daily    Cystic acne       spironolactone 50 MG tablet    ALDACTONE    90 tablet    Take 1 tablet (50 mg) by mouth daily    Cystic acne       valACYclovir 1000 mg tablet    VALTREX    12 tablet    Take 2 tablets (2,000 mg) by mouth 2 times daily At first onset of cold sore for 1 day.    Cold sore

## 2018-08-23 LAB — COPATH REPORT: NORMAL

## 2018-09-05 DIAGNOSIS — B00.1 COLD SORE: ICD-10-CM

## 2018-09-05 RX ORDER — VALACYCLOVIR HYDROCHLORIDE 1 G/1
TABLET, FILM COATED ORAL
Qty: 12 TABLET | Refills: 0 | Status: SHIPPED | OUTPATIENT
Start: 2018-09-05 | End: 2018-10-04

## 2018-09-05 NOTE — TELEPHONE ENCOUNTER
"Prescription approved per [facility] Refill Protocol.  Ni OCAMPO RN    Requested Prescriptions   Pending Prescriptions Disp Refills     valACYclovir (VALTREX) 1000 mg tablet [Pharmacy Med Name: VALACYCLOVIR 1GM TABLETS] 12 tablet 0     Sig: TAKE 2 TABLETS BY MOUTH TWICE DAILY AT FIRST ONSET FOR COLD SORE FOR 1 DAY.    Antivirals for Herpes Protocol Passed    9/5/2018  9:14 AM       Passed - Patient is age 12 or older       Passed - Recent (12 mo) or future (30 days) visit within the authorizing provider's specialty    Patient had office visit in the last 12 months or has a visit in the next 30 days with authorizing provider or within the authorizing provider's specialty.  See \"Patient Info\" tab in inbasket, or \"Choose Columns\" in Meds & Orders section of the refill encounter.           Passed - Normal serum creatinine on file in past 12 months    Recent Labs   Lab Test  10/13/17   1644   CR  0.69                 "

## 2018-10-04 DIAGNOSIS — B00.1 COLD SORE: ICD-10-CM

## 2018-10-05 NOTE — TELEPHONE ENCOUNTER
"A.S.,  Pt had this filled 9/5/18 and would have used 6 treatments in one month. Also due for physical with creat lab- added info into pharm comments.  Please authorize if appropriate.  Thanks,  Chantel Hoffman RN            Requested Prescriptions   Pending Prescriptions Disp Refills     valACYclovir (VALTREX) 1000 mg tablet [Pharmacy Med Name: VALACYCLOVIR 1GM TABLETS] 12 tablet 0     Sig: TAKE 2 TABLETS BY MOUTH TWICE DAILY AT FIRST ONSET FOR COLD SORE FOR 1 DAY.    Antivirals for Herpes Protocol Passed    10/4/2018  9:05 PM       Passed - Patient is age 12 or older       Passed - Recent (12 mo) or future (30 days) visit within the authorizing provider's specialty    Patient had office visit in the last 12 months or has a visit in the next 30 days with authorizing provider or within the authorizing provider's specialty.  See \"Patient Info\" tab in inbasket, or \"Choose Columns\" in Meds & Orders section of the refill encounter.           Passed - Normal serum creatinine on file in past 12 months    Recent Labs   Lab Test  10/13/17   1644   CR  0.69             "

## 2018-10-09 ENCOUNTER — MYC MEDICAL ADVICE (OUTPATIENT)
Dept: FAMILY MEDICINE | Facility: CLINIC | Age: 33
End: 2018-10-09

## 2018-10-09 RX ORDER — VALACYCLOVIR HYDROCHLORIDE 1 G/1
TABLET, FILM COATED ORAL
Qty: 12 TABLET | Refills: 0 | Status: SHIPPED | OUTPATIENT
Start: 2018-10-09 | End: 2018-10-19

## 2018-10-09 NOTE — TELEPHONE ENCOUNTER
Please call patient or send mychart for clarification about primary care physicain   I have send refills.  She was Shantell Fall's  patient and has up coming appointment with her, & that's great as she due for phsyical/pap .  thanks

## 2018-10-19 ENCOUNTER — OFFICE VISIT (OUTPATIENT)
Dept: FAMILY MEDICINE | Facility: CLINIC | Age: 33
End: 2018-10-19
Payer: COMMERCIAL

## 2018-10-19 VITALS
RESPIRATION RATE: 16 BRPM | BODY MASS INDEX: 21.69 KG/M2 | WEIGHT: 135 LBS | HEART RATE: 75 BPM | HEIGHT: 66 IN | SYSTOLIC BLOOD PRESSURE: 118 MMHG | DIASTOLIC BLOOD PRESSURE: 74 MMHG | TEMPERATURE: 98.8 F | OXYGEN SATURATION: 99 %

## 2018-10-19 DIAGNOSIS — B00.1 COLD SORE: ICD-10-CM

## 2018-10-19 DIAGNOSIS — L70.0 CYSTIC ACNE: ICD-10-CM

## 2018-10-19 DIAGNOSIS — Z00.00 ROUTINE GENERAL MEDICAL EXAMINATION AT A HEALTH CARE FACILITY: Primary | ICD-10-CM

## 2018-10-19 PROCEDURE — 99395 PREV VISIT EST AGE 18-39: CPT | Performed by: FAMILY MEDICINE

## 2018-10-19 RX ORDER — SPIRONOLACTONE 50 MG/1
50 TABLET, FILM COATED ORAL DAILY
Qty: 90 TABLET | Refills: 3 | Status: SHIPPED | OUTPATIENT
Start: 2018-10-19 | End: 2019-10-03

## 2018-10-19 RX ORDER — VALACYCLOVIR HYDROCHLORIDE 1 G/1
2000 TABLET, FILM COATED ORAL 2 TIMES DAILY
Qty: 12 TABLET | Refills: 5 | Status: SHIPPED | OUTPATIENT
Start: 2018-10-19 | End: 2019-04-01

## 2018-10-19 RX ORDER — LEVONORGESTREL/ETHIN.ESTRADIOL 0.1-0.02MG
1 TABLET ORAL DAILY
Qty: 90 TABLET | Refills: 3 | Status: SHIPPED | OUTPATIENT
Start: 2018-10-19 | End: 2019-06-15

## 2018-10-19 NOTE — MR AVS SNAPSHOT
"              After Visit Summary   10/19/2018    Lauren Fallon    MRN: [id number]           Patient Information     Date Of Birth          1985        Visit Information        Provider Department      10/19/2018 4:40 PM Shantell Fall MD [facility]        Today's Diagnoses     Routine general medical examination at a health care facility    -  1    Cystic acne        Cold sore           Follow-ups after your visit        Who to contact     If you have questions or need follow up information about today's clinic visit or your schedule please contact [facility] directly at [phone].  Normal or non-critical lab and imaging results will be communicated to you by MyChart, letter or phone within 4 business days after the clinic has received the results. If you do not hear from us within 7 days, please contact the clinic through V[employer]on[employer] or phone. If you have a critical or abnormal lab result, we will notify you by phone as soon as possible.  Submit refill requests through [employer] or call your pharmacy and they will forward the refill request to us. Please allow 3 business days for your refill to be completed.          Additional Information About Your Visit        MyChart Information     [employer] gives you secure access to your electronic health record. If you see a primary care provider, you can also send messages to your care team and make appointments. If you have questions, please call your primary care clinic.  If you do not have a primary care provider, please call [phone] and they will assist you.        Care EveryWhere ID     This is your Care EveryWhere ID. This could be used by other organizations to access your [place] medical records  [id number]        Your Vitals Were     Pulse Temperature Respirations Height Pulse Oximetry BMI (Body Mass Index)    75 98.8  F (37.1  C) (Tympanic) 16 5' 5.5\" (1.664 m) 99% 22.12 " kg/m2       Blood Pressure from Last 3 Encounters:   10/19/18 118/74   08/21/18 138/78   08/07/18 125/77    Weight from Last 3 Encounters:   10/19/18 135 lb (61.2 kg)   08/21/18 143 lb 9.6 oz (65.1 kg)   08/07/18 142 lb (64.4 kg)              Today, you had the following     No orders found for display         Today's Medication Changes          These changes are accurate as of 10/19/18  5:18 PM.  If you have any questions, ask your nurse or doctor.               These medicines have changed or have updated prescriptions.        Dose/Directions    valACYclovir 1000 mg tablet   Commonly known as:  VALTREX   This may have changed:  See the new instructions.   Used for:  Cold sore   Changed by:  Shantell Fall MD        Dose:  2000 mg   Take 2 tablets (2,000 mg) by mouth 2 times daily At first sign of cold sore for 2 doses.   Quantity:  12 tablet   Refills:  5            Where to get your medicines      These medications were sent to [email or web address] [address] [place] [address] & [facility]  [address] [place] [id number]     Phone:  [phone]     levonorgestrel-ethinyl estradiol 0.1-20 MG-MCG per tablet    spironolactone 50 MG tablet    valACYclovir 1000 mg tablet                Primary Care Provider Office Phone # Fax #    Shantell Fall [phone] [address] [facility] [address]        Equal Access to Services     HANNA AYALA AH: Adal singletary Sobebo, waaxda lubaron, qaybta kaalmajesus hampton. So [facility] [phone].    ATENCIÓN: Si habla español, tiene a baltazar disposición servicios gratuitos de asistencia lingüística. Llame al [phone].    We comply with applicable federal civil rights laws and [place] laws. We do not discriminate on the basis of race, color, national origin, age, disability, sex, sexual orientation, or gender identity.            Thank you!     Thank you for choosing  [facility]  for your care. Our goal is always to provide you with excellent care. Hearing back from our patients is one way we can continue to improve our services. Please take a few minutes to complete the written survey that you may receive in the mail after your visit with us. Thank you!             Your Updated Medication List - Protect others around you: Learn how to safely use, store and throw away your medicines at www.disposemymeds.org.          This list is accurate as of 10/19/18  5:18 PM.  Always use your most recent med list.                   Brand Name Dispense Instructions for use Diagnosis    levonorgestrel-ethinyl estradiol 0.1-20 MG-MCG per tablet    VIENVA    90 tablet    Take 1 tablet by mouth daily    Cystic acne       spironolactone 50 MG tablet    ALDACTONE    90 tablet    Take 1 tablet (50 mg) by mouth daily    Cystic acne       valACYclovir 1000 mg tablet    VALTREX    12 tablet    Take 2 tablets (2,000 mg) by mouth 2 times daily At first sign of cold sore for 2 doses.    Cold sore

## 2019-04-01 DIAGNOSIS — B00.1 COLD SORE: ICD-10-CM

## 2019-04-02 NOTE — TELEPHONE ENCOUNTER
"Requested Prescriptions   Pending Prescriptions Disp Refills     valACYclovir (VALTREX) 1000 mg tablet [Pharmacy Med Name: VALACYCLOVIR 1GM TABLETS]  Last Written Prescription Date:  10/19/2018  Last Fill Quantity: 12 tablet,  # refills: 5   Last office visit: 10/19/2018 with prescribing provider:  Juan David   Future Office Visit:     12 tablet 0     Sig: TAKE 2 TABLETS BY MOUTH TWICE DAILY AT THE FIRST SIGN OF COLD SORE X 2 DOSES    Antivirals for Herpes Protocol Failed - 4/1/2019  8:40 PM       Failed - Normal serum creatinine on file in past 12 months    Recent Labs   Lab Test 10/13/17  1644   CR 0.69            Passed - Patient is age 12 or older       Passed - Recent (12 mo) or future (30 days) visit within the authorizing provider's specialty    Patient had office visit in the last 12 months or has a visit in the next 30 days with authorizing provider or within the authorizing provider's specialty.  See \"Patient Info\" tab in inbasket, or \"Choose Columns\" in Meds & Orders section of the refill encounter.             Passed - Medication is active on med list           "

## 2019-04-03 RX ORDER — VALACYCLOVIR HYDROCHLORIDE 1 G/1
TABLET, FILM COATED ORAL
Qty: 12 TABLET | Refills: 0 | Status: SHIPPED | OUTPATIENT
Start: 2019-04-03 | End: 2019-04-11

## 2019-04-11 ENCOUNTER — MYC REFILL (OUTPATIENT)
Dept: FAMILY MEDICINE | Facility: CLINIC | Age: 34
End: 2019-04-11

## 2019-04-11 DIAGNOSIS — B00.1 COLD SORE: ICD-10-CM

## 2019-04-11 NOTE — TELEPHONE ENCOUNTER
"Requested Prescriptions   Pending Prescriptions Disp Refills     valACYclovir (VALTREX) 1000 mg tablet  Last Written Prescription Date:  4/3/19  Last Fill Quantity: 12,  # refills: 0   Last office visit: 10/19/2018 with prescribing provider:  Juan David   Future Office Visit:     12 tablet 0       Antivirals for Herpes Protocol Failed - 4/11/2019 12:34 PM        Failed - Normal serum creatinine on file in past 12 months     Recent Labs   Lab Test 10/13/17  1644   CR 0.69             Passed - Patient is age 12 or older        Passed - Recent (12 mo) or future (30 days) visit within the authorizing provider's specialty     Patient had office visit in the last 12 months or has a visit in the next 30 days with authorizing provider or within the authorizing provider's specialty.  See \"Patient Info\" tab in inbasket, or \"Choose Columns\" in Meds & Orders section of the refill encounter.              Passed - Medication is active on med list          "

## 2019-04-12 RX ORDER — VALACYCLOVIR HYDROCHLORIDE 1 G/1
TABLET, FILM COATED ORAL
Qty: 12 TABLET | Refills: 3 | Status: SHIPPED | OUTPATIENT
Start: 2019-04-12

## 2019-06-15 DIAGNOSIS — L70.0 CYSTIC ACNE: ICD-10-CM

## 2019-06-17 NOTE — TELEPHONE ENCOUNTER
"Requested Prescriptions   Pending Prescriptions Disp Refills     VIENVA 0.1-20 MG-MCG tablet [Pharmacy Med Name: VIENVA 0.1MG/0.02MG TABS 28S]  Last Written Prescription Date:  10/19/2018  Last Fill Quantity: 90 tablet,  # refills: 3   Last office visit: 10/19/2018 with prescribing provider:  Juan David   Future Office Visit:     84 tablet 0     Sig: TAKE 1 TABLET BY MOUTH EVERY DAY       Contraceptives Protocol Passed - 6/15/2019  9:23 AM        Passed - Patient is not a current smoker if age is 35 or older        Passed - Recent (12 mo) or future (30 days) visit within the authorizing provider's specialty     Patient had office visit in the last 12 months or has a visit in the next 30 days with authorizing provider or within the authorizing provider's specialty.  See \"Patient Info\" tab in inbasket, or \"Choose Columns\" in Meds & Orders section of the refill encounter.              Passed - Medication is active on med list        Passed - No active pregnancy on record        Passed - No positive pregnancy test in past 12 months           "

## 2019-06-18 RX ORDER — TIMOLOL MALEATE 5 MG/ML
SOLUTION/ DROPS OPHTHALMIC
Qty: 84 TABLET | Refills: 1 | Status: SHIPPED | OUTPATIENT
Start: 2019-06-18

## 2019-10-03 DIAGNOSIS — L70.0 CYSTIC ACNE: ICD-10-CM

## 2019-10-03 NOTE — TELEPHONE ENCOUNTER
"Requested Prescriptions   Pending Prescriptions Disp Refills     spironolactone (ALDACTONE) 50 MG tablet [Pharmacy Med Name: SPIRONOLACTONE 50MG TABLETS]    Last Written Prescription Date:  10/19/2018  Last Fill Quantity: 90 tablet,  # refills: 3   Last office visit: 10/19/2018 with prescribing provider:  Juan David   Future Office Visit:     90 tablet 0     Sig: TAKE 1 TABLET(50 MG) BY MOUTH DAILY       Diuretics (Including Combos) Protocol Failed - 10/3/2019  1:52 AM        Failed - Normal serum creatinine on file in past 12 months     Recent Labs   Lab Test 10/13/17  1644   CR 0.69              Failed - Normal serum potassium on file in past 12 months     Recent Labs   Lab Test 10/13/17  1644   POTASSIUM 4.0                    Failed - Normal serum sodium on file in past 12 months     Recent Labs   Lab Test 10/13/17  1644                 Passed - Blood pressure under 140/90 in past 12 months     BP Readings from Last 3 Encounters:   10/19/18 118/74   08/21/18 138/78   08/07/18 125/77                 Passed - Recent (12 mo) or future (30 days) visit within the authorizing provider's specialty     Patient has had an office visit with the authorizing provider or a provider within the authorizing providers department within the previous 12 mos or has a future within next 30 days. See \"Patient Info\" tab in inbasket, or \"Choose Columns\" in Meds & Orders section of the refill encounter.              Passed - Medication is active on med list        Passed - Patient is age 18 or older        Passed - No active pregancy on record        Passed - No positive pregnancy test in past 12 months           "

## 2019-10-04 RX ORDER — SPIRONOLACTONE 50 MG/1
TABLET, FILM COATED ORAL
Qty: 90 TABLET | Refills: 0 | Status: SHIPPED | OUTPATIENT
Start: 2019-10-04

## 2019-10-04 NOTE — TELEPHONE ENCOUNTER
Left voice message informing pt OV needed.     Routing refill request to provider for review/approval because:  Labs not current:  CR, potassium and NA

## 2020-02-17 ENCOUNTER — HEALTH MAINTENANCE LETTER (OUTPATIENT)
Age: 35
End: 2020-02-17

## 2020-04-01 DIAGNOSIS — L70.0 CYSTIC ACNE: ICD-10-CM

## 2020-04-02 RX ORDER — LEVONORGESTREL AND ETHINYL ESTRADIOL 0.1-0.02MG
KIT ORAL
Qty: 84 TABLET | Refills: 1 | OUTPATIENT
Start: 2020-04-02

## 2020-04-02 NOTE — TELEPHONE ENCOUNTER
Does need some kind of visit  Could be virtual/telephone.  I haven't seen her since 2018.  Dr. Shantell Fall MD / [facility]

## 2020-04-03 NOTE — TELEPHONE ENCOUNTER
Called and reached patient. States she moved out of [place] and is no longer with [facility]. Removed PCP. Please disregard. Thanks!

## 2020-11-29 ENCOUNTER — HEALTH MAINTENANCE LETTER (OUTPATIENT)
Age: 35
End: 2020-11-29

## 2021-04-10 ENCOUNTER — HEALTH MAINTENANCE LETTER (OUTPATIENT)
Age: 36
End: 2021-04-10

## 2021-09-25 ENCOUNTER — HEALTH MAINTENANCE LETTER (OUTPATIENT)
Age: 36
End: 2021-09-25

## 2022-05-07 ENCOUNTER — HEALTH MAINTENANCE LETTER (OUTPATIENT)
Age: 37
End: 2022-05-07

## 2022-12-26 ENCOUNTER — HEALTH MAINTENANCE LETTER (OUTPATIENT)
Age: 37
End: 2022-12-26

## 2023-06-02 ENCOUNTER — HEALTH MAINTENANCE LETTER (OUTPATIENT)
Age: 38
End: 2023-06-02